# Patient Record
Sex: FEMALE | Race: WHITE | NOT HISPANIC OR LATINO | Employment: FULL TIME | URBAN - NONMETROPOLITAN AREA
[De-identification: names, ages, dates, MRNs, and addresses within clinical notes are randomized per-mention and may not be internally consistent; named-entity substitution may affect disease eponyms.]

---

## 2017-09-21 ENCOUNTER — TRANSCRIBE ORDERS (OUTPATIENT)
Dept: ADMINISTRATIVE | Facility: HOSPITAL | Age: 47
End: 2017-09-21

## 2017-09-21 DIAGNOSIS — Z12.31 VISIT FOR SCREENING MAMMOGRAM: Primary | ICD-10-CM

## 2017-11-03 ENCOUNTER — HOSPITAL ENCOUNTER (OUTPATIENT)
Dept: MAMMOGRAPHY | Facility: HOSPITAL | Age: 47
Discharge: HOME | End: 2017-11-03
Admitting: OBSTETRICS & GYNECOLOGY

## 2017-11-03 DIAGNOSIS — Z12.31 VISIT FOR SCREENING MAMMOGRAM: ICD-10-CM

## 2017-11-03 PROCEDURE — 77063 BREAST TOMOSYNTHESIS BI: CPT | Performed by: RADIOLOGY

## 2017-11-03 PROCEDURE — 77067 SCR MAMMO BI INCL CAD: CPT | Performed by: RADIOLOGY

## 2017-11-03 PROCEDURE — 77063 BREAST TOMOSYNTHESIS BI: CPT

## 2017-11-03 PROCEDURE — G0202 SCR MAMMO BI INCL CAD: HCPCS

## 2017-11-10 ENCOUNTER — HOSPITAL ENCOUNTER (OUTPATIENT)
Dept: ULTRASOUND IMAGING | Facility: HOSPITAL | Age: 47
Discharge: HOME | End: 2017-11-10
Admitting: RADIOLOGY

## 2017-11-10 DIAGNOSIS — R92.8 ABNORMAL MAMMOGRAM: ICD-10-CM

## 2017-11-10 PROCEDURE — 76642 ULTRASOUND BREAST LIMITED: CPT | Performed by: RADIOLOGY

## 2017-11-10 PROCEDURE — 76642 ULTRASOUND BREAST LIMITED: CPT

## 2020-12-23 ENCOUNTER — HOSPITAL ENCOUNTER (EMERGENCY)
Facility: HOSPITAL | Age: 50
Discharge: HOME | End: 2020-12-23
Attending: EMERGENCY MEDICINE | Admitting: EMERGENCY MEDICINE

## 2020-12-23 ENCOUNTER — APPOINTMENT (OUTPATIENT)
Dept: GENERAL RADIOLOGY | Facility: HOSPITAL | Age: 50
End: 2020-12-23

## 2020-12-23 VITALS
HEART RATE: 111 BPM | WEIGHT: 268 LBS | BODY MASS INDEX: 47.48 KG/M2 | TEMPERATURE: 97.7 F | OXYGEN SATURATION: 97 % | RESPIRATION RATE: 20 BRPM | DIASTOLIC BLOOD PRESSURE: 102 MMHG | SYSTOLIC BLOOD PRESSURE: 192 MMHG | HEIGHT: 63 IN
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
## 2020-12-23 DIAGNOSIS — M79.18 MUSCULOSKELETAL PAIN: ICD-10-CM

## 2020-12-23 PROCEDURE — 73562 X-RAY EXAM OF KNEE 3: CPT | Performed by: RADIOLOGY

## 2020-12-23 PROCEDURE — 73502 X-RAY EXAM HIP UNI 2-3 VIEWS: CPT | Performed by: RADIOLOGY

## 2020-12-23 PROCEDURE — 73502 X-RAY EXAM HIP UNI 2-3 VIEWS: CPT

## 2020-12-23 PROCEDURE — 73562 X-RAY EXAM OF KNEE 3: CPT

## 2020-12-23 PROCEDURE — 99283 EMERGENCY DEPT VISIT LOW MDM: CPT

## 2020-12-23 RX ORDER — ORPHENADRINE CITRATE 100 MG/1
100 TABLET, EXTENDED RELEASE ORAL 2 TIMES DAILY PRN
Qty: 14 TABLET | Refills: 0 | Status: SHIPPED | OUTPATIENT
Start: 2020-12-23 | End: 2021-12-06

## 2020-12-23 RX ORDER — IBUPROFEN 600 MG/1
600 TABLET ORAL EVERY 6 HOURS PRN
Qty: 30 TABLET | Refills: 0 | Status: SHIPPED | OUTPATIENT
Start: 2020-12-23 | End: 2021-12-06
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
## 2021-12-06 RX ORDER — BUSPIRONE HYDROCHLORIDE 7.5 MG/1
7.5 TABLET ORAL 2 TIMES DAILY
COMMUNITY

## 2021-12-06 RX ORDER — B-COMPLEX WITH VITAMIN C
50 TABLET ORAL 2 TIMES DAILY
COMMUNITY

## 2021-12-06 RX ORDER — PRAVASTATIN SODIUM 20 MG
20 TABLET ORAL DAILY
COMMUNITY
End: 2022-06-14

## 2021-12-06 RX ORDER — DIPHENOXYLATE HYDROCHLORIDE AND ATROPINE SULFATE 2.5; .025 MG/1; MG/1
TABLET ORAL
COMMUNITY
Start: 2013-07-31 | End: 2022-06-14

## 2021-12-06 RX ORDER — LOSARTAN POTASSIUM 50 MG/1
25 TABLET ORAL DAILY
COMMUNITY

## 2021-12-06 RX ORDER — HYDROCHLOROTHIAZIDE 12.5 MG/1
12.5 CAPSULE, GELATIN COATED ORAL DAILY
COMMUNITY
End: 2022-09-24 | Stop reason: HOSPADM

## 2021-12-06 RX ORDER — DILTIAZEM HYDROCHLORIDE 180 MG/1
180 CAPSULE, COATED, EXTENDED RELEASE ORAL DAILY
COMMUNITY
End: 2021-12-16

## 2021-12-06 RX ORDER — LEVOTHYROXINE SODIUM 0.07 MG/1
75 TABLET ORAL DAILY
COMMUNITY
Start: 2013-07-31

## 2021-12-06 RX ORDER — MELOXICAM 15 MG/1
15 TABLET ORAL DAILY
COMMUNITY
End: 2022-09-24 | Stop reason: HOSPADM

## 2021-12-06 RX ORDER — MULTIPLE VITAMINS W/ MINERALS TAB 9MG-400MCG
1 TAB ORAL DAILY
COMMUNITY
End: 2021-12-16

## 2021-12-06 RX ORDER — FEXOFENADINE HCL 180 MG/1
180 TABLET ORAL DAILY
COMMUNITY

## 2021-12-06 RX ORDER — ESOMEPRAZOLE MAGNESIUM 40 MG/1
40 CAPSULE, DELAYED RELEASE ORAL
COMMUNITY

## 2021-12-16 ENCOUNTER — OFFICE VISIT (OUTPATIENT)
Dept: BEHAVIORAL HEALTH | Facility: CLINIC | Age: 51
End: 2021-12-16

## 2021-12-16 ENCOUNTER — DOCUMENTATION (OUTPATIENT)
Dept: BARIATRICS/WEIGHT MGMT | Facility: CLINIC | Age: 51
End: 2021-12-16

## 2021-12-16 ENCOUNTER — OFFICE VISIT (OUTPATIENT)
Dept: BARIATRICS/WEIGHT MGMT | Facility: CLINIC | Age: 51
End: 2021-12-16

## 2021-12-16 VITALS
TEMPERATURE: 97.8 F | BODY MASS INDEX: 44.22 KG/M2 | RESPIRATION RATE: 18 BRPM | HEIGHT: 64 IN | WEIGHT: 259 LBS | OXYGEN SATURATION: 98 % | HEART RATE: 91 BPM | SYSTOLIC BLOOD PRESSURE: 140 MMHG | DIASTOLIC BLOOD PRESSURE: 96 MMHG

## 2021-12-16 DIAGNOSIS — Z71.89 ENCOUNTER FOR PSYCHOLOGICAL ASSESSMENT PRIOR TO BARIATRIC SURGERY: ICD-10-CM

## 2021-12-16 DIAGNOSIS — F41.9 ANXIETY: ICD-10-CM

## 2021-12-16 DIAGNOSIS — E66.01 MORBID OBESITY (HCC): ICD-10-CM

## 2021-12-16 DIAGNOSIS — K21.9 GASTROESOPHAGEAL REFLUX DISEASE, UNSPECIFIED WHETHER ESOPHAGITIS PRESENT: ICD-10-CM

## 2021-12-16 DIAGNOSIS — E03.9 HYPOTHYROIDISM, UNSPECIFIED TYPE: ICD-10-CM

## 2021-12-16 DIAGNOSIS — E66.01 MORBID OBESITY (HCC): Primary | ICD-10-CM

## 2021-12-16 DIAGNOSIS — E78.5 HYPERLIPIDEMIA, UNSPECIFIED HYPERLIPIDEMIA TYPE: ICD-10-CM

## 2021-12-16 DIAGNOSIS — E11.69 DIABETES MELLITUS TYPE 2 IN OBESE (HCC): ICD-10-CM

## 2021-12-16 DIAGNOSIS — E66.9 DIABETES MELLITUS TYPE 2 IN OBESE (HCC): ICD-10-CM

## 2021-12-16 DIAGNOSIS — I10 HYPERTENSION, UNSPECIFIED TYPE: ICD-10-CM

## 2021-12-16 DIAGNOSIS — M25.569 KNEE PAIN, UNSPECIFIED CHRONICITY, UNSPECIFIED LATERALITY: Primary | ICD-10-CM

## 2021-12-16 DIAGNOSIS — K80.20 CALCULUS OF GALLBLADDER WITHOUT CHOLECYSTITIS WITHOUT OBSTRUCTION: ICD-10-CM

## 2021-12-16 PROCEDURE — 90791 PSYCH DIAGNOSTIC EVALUATION: CPT | Performed by: PSYCHOLOGIST

## 2021-12-16 PROCEDURE — 99204 OFFICE O/P NEW MOD 45 MIN: CPT | Performed by: PHYSICIAN ASSISTANT

## 2021-12-16 RX ORDER — DILTIAZEM HYDROCHLORIDE 180 MG/1
180 CAPSULE, EXTENDED RELEASE ORAL DAILY
COMMUNITY
Start: 2021-11-30 | End: 2022-09-24 | Stop reason: HOSPADM

## 2021-12-16 RX ORDER — CLONIDINE HYDROCHLORIDE 0.1 MG/1
0.1 TABLET ORAL AS NEEDED
COMMUNITY
Start: 2021-10-10

## 2021-12-16 NOTE — PROGRESS NOTES
[facility]  [address]  [address]  [place], located within [facility] [id number] [phone]      Patient  Name:  Emily Germain  :  1970      Date of Visit: 2021      Chief Complaint:  weight gain; unable to maintain weight loss      History of Present Illness:  Emily Germain is a 51 y.o. female who presents today for evaluation, education and consultation regarding metabolic and bariatric surgery with Dr. Joel.     Emily has been overweight most of her life, has been 100 pounds or more overweight for 30 or more years and started dieting in her teens.  Previous diet attempts include: Sanjay Daley, High Protein, Herbal Life, Low Carbohydrate, Low Fat, Calorie Counting, Salma's Diet, Cabbage Soup and Slim Fast; Nutri-System, Optifast and Weight Watchers; Acutrim and Dexatrim.  The most weight Emily lost was 42 pounds w/ Optivia but was unable to maintain that weight loss.  Her maximum lifetime weight is 275 pounds.       Complete history has been obtained and discussed today, as pertinent to metabolic/ bariatric surgery.     Past Medical History:   Diagnosis Date   • Anxiety and depression    • Cholelithiasis     dx 18+yrs ago, relatively asx   • Diabetes (HCC)     newly dx, no insulin, A1C >7   • Dyspepsia    • Dyspnea on exertion    • Edema     HCTZ 12.5mg daily   • Fatigue    • GERD (gastroesophageal reflux disease)     on Nexium x 5+yrs, improves w/ wt.loss, remote EGD (20+yrs ago) unremarkable   • HTN (hypertension)    • Hyperlipidemia    • Hypothyroidism    • Knee pain     on Mobic, prn steroid injections (next due 3/2022)   • Morbid obesity (HCC)    • PCOS (polycystic ovarian syndrome)     does not tolerate Metformin     Past Surgical History:   Procedure Laterality Date   • DILATATION AND CURETTAGE      emergent, miscarriage @ 6mos   • ENDOMETRIAL ABLATION         Allergies   Allergen Reactions   • Tuberculin Tests Swelling       Current  Outpatient Medications:   •  busPIRone (BUSPAR) 7.5 MG tablet, Take 7.5 mg by mouth 3 (Three) Times a Day., Disp: , Rfl:   •  cloNIDine (CATAPRES) 0.1 MG tablet, TAKE 1 TABLET BY MOUTH TWICE DAILY AS NEEDED FOR BLOOD PRESSURE GREATER THAN 165/95, Disp: , Rfl:   •  esomeprazole (nexIUM) 40 MG capsule, Take 40 mg by mouth Every Morning Before Breakfast., Disp: , Rfl:   •  fexofenadine (ALLEGRA) 180 MG tablet, Take 180 mg by mouth Daily., Disp: , Rfl:   •  hydroCHLOROthiazide (MICROZIDE) 12.5 MG capsule, Take 12.5 mg by mouth Daily., Disp: , Rfl:   •  levothyroxine (Synthroid) 137 MCG tablet, Take  by mouth., Disp: , Rfl:   •  losartan (COZAAR) 50 MG tablet, Take 50 mg by mouth Daily., Disp: , Rfl:   •  meloxicam (MOBIC) 15 MG tablet, Take 15 mg by mouth Daily., Disp: , Rfl:   •  multivitamin (Multi-Vitamin Daily) tablet tablet, Take  by mouth., Disp: , Rfl:   •  pravastatin (PRAVACHOL) 20 MG tablet, Take 20 mg by mouth Daily., Disp: , Rfl:   •  SITagliptin (JANUVIA) 100 MG tablet, Take 100 mg by mouth Daily., Disp: , Rfl:   •  Tiadylt  MG 24 hr capsule, Take 180 mg by mouth Daily., Disp: , Rfl:   •  Zinc 100 MG tablet, Take  by mouth., Disp: , Rfl:     Social History     Socioeconomic History   • Marital status:    Tobacco Use   • Smoking status: Never Smoker   • Smokeless tobacco: Never Used   Substance and Sexual Activity   • Alcohol use: Never   • Drug use: Never     Social History     Social History Narrative    .  Lives in [facility].  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        Family History   Problem Relation Age of Onset   • Breast cancer Maternal Grandmother    • Hypertension Maternal Grandmother    • Hypertension Mother    • Diabetes Father    • Hypertension Father    • Hypertension Maternal Grandfather    • Heart attack Maternal Grandfather    • Hypertension Paternal Grandmother    • Diabetes Paternal Grandfather    • Hypertension Paternal Grandfather        Review of  Systems:  Constitutional:  reports fatigue, weight gain and denies fevers, chills.  HEENT:  denies headache, ear pain or loss of hearing, blurred or double vision, nasal discharge or sore throat.  Cardiovascular:  reports HTN, HLD and denies hx heart disease, hx MI, chest pain, hx DVT.  Respiratory:  denies cough , wheezing, sleep apnea, asthma, hx PE.  Gastrointestinal:  reports heartburn, gallbladder issues and denies dysphagia, nausea, vomiting, abdominal pain, IBS, liver disease.  Genitourinary:   denies history of  frequent UTI, incontinence, hematuria, dysuria, polyuria, polydipsia, renal insufficiency.    Musculoskeletal:  reports joint pain, arthritis and denies fibromyalgia and autoimmune disease.  Neurological:   denies migraines, numbness /tingling, dizziness, confusion, seizure, stroke.  Psychiatric:  reports hx depression, hx anxiety and denies bipolar disorder.  Endocrine:  reports PCOS, diabetes, thyroid disease and denies gout.  Hematologic: denies bruising, bleeding disorder, hx anemia, hx blood transfusion.  Skin:  denies rashes, hx MRSA.      COVID-19 Questionnaire:    1.  Have you previously been tested for COVID-19?    []  No  [x]  Yes  2.  Were you ever positive for COVID-19?    [x]  No  []  Yes  3.  Are you employed in a healthcare setting?    [x]  No  []  Yes  4.  Are you symptomatic for COVID-19 as defined by the CDC (fever, cough)?  If so, when did symptoms begin?    [x]  No  []  Yes, symptoms began **  5.  Have you been hospitalized for COVID-19?  If so, were you in the ICU?  [x]  No  []  Yes, but not in the ICU  []  Yes, and I was in the ICU  6.  Are you a resident in a congregate (group care setting?)    [x]  No  []  Yes  7.  Are you pregnant?  [x]  No  []  Yes      Physical Exam:  Vital Signs:  Weight: 117 kg (259 lb)   Body mass index is 45.16 kg/m².  Temp: 97.8 °F (36.6 °C)   Heart Rate: 91   BP: 140/96     Physical Exam  Vitals reviewed.   Constitutional:       Appearance: She is  well-developed.      Comments: wearing a mask   HENT:      Head: Normocephalic and atraumatic.   Eyes:      General: No scleral icterus.     Conjunctiva/sclera: Conjunctivae normal.   Neck:      Thyroid: No thyromegaly.   Cardiovascular:      Rate and Rhythm: Normal rate and regular rhythm.      Heart sounds: No murmur heard.      Pulmonary:      Effort: Pulmonary effort is normal. No respiratory distress.      Breath sounds: Normal breath sounds. No wheezing or rales.   Abdominal:      General: Bowel sounds are normal. There is no distension.      Palpations: Abdomen is soft. There is no mass.      Tenderness: There is no abdominal tenderness.      Hernia: No hernia is present.      Comments: no surgical scars   Musculoskeletal:         General: Normal range of motion.      Cervical back: Neck supple.   Skin:     General: Skin is warm and dry.      Findings: No rash.   Neurological:      Mental Status: She is alert and oriented to person, place, and time.      Gait: Gait normal.   Psychiatric:         Judgment: Judgment normal.         Patient Active Problem List   Diagnosis   • GERD (gastroesophageal reflux disease)   • HTN (hypertension)   • Hypothyroidism   • Hyperlipidemia   • Edema   • Diabetes (HCC)   • Knee pain   • Anxiety and depression   • Fatigue   • Dyspepsia   • Morbid obesity (HCC)   • Dyspnea on exertion   • PCOS (polycystic ovarian syndrome)   • Cholelithiasis       Assessment:  51 y.o. female with medically complicated obesity pursuing sleeve gastrectomy.    Metabolic & Bariatric Surgery is deemed medically necessary given the following: Patient's Body mass index is 45.16 kg/m². indicating that she is obese (BMI >30). Obesity-related health conditions include the following: hypertension, diabetes mellitus, dyslipidemias, GERD and osteoarthritis. Obesity is worsening. BMI is is above average; BMI management plan is completed. We discussed consulting a Bariatric surgeon.        Plan:  Further  evaluation will include: CBC, CMP, Lipids, TSH, HgA1C, H.Pylori serum, EKG, CXR, GBUS, Gastric Emptying Study and EGD.    Additional clearances needed prior to surgery will include: Cardiology.     Patient understands that bariatric surgery is not cosmetic surgery but rather a tool to help make a lifelong commitment to lifestyle changes including diet, exercise and behavior modifications.  The patient has been educated today on those expected postoperative lifestyle changes.  Psychological and Nutritional consultations will be completed prior to surgery.  Instructions on how to access [employer] (an internet based site w/ educational surgical videos) were given to the patient.  Recommended perioperative vitamin supplementation was reviewed.  The importance of avoiding ASA/ NSAIDS/ steroids/ tobacco/nicotine/ hormones/ immunomodulators perioperatively was discussed in detail.  All questions/concerns have been addressed.      Further input to follow pending the above.           SNEAH Segura

## 2021-12-16 NOTE — PROGRESS NOTES
PROGRESS NOTE    Data:    Emily Germain is a 51 y.o. female who met with the undersigned for a scheduled individual outpatient therapy session from 9:00 - 9:45am.      Clinical Maneuvering/Intervention:      Chief complaint and history of presenting illness/Problems: struggling with obesity for several years. Despite trying different weight loss plans and diets, the pt reported being unsuccessful in losing weight. A psychological evaluation was conducted in order to assess past and current level of functioning. Areas assessed included, but were not limited to: perception of social support, perception of ability to face and deal with challenges in life (positive functioning), anxiety symptoms, depressive symptoms, perspective on beliefs/belief system, coping skills for stress, intelligence level, addiction issues, etc. Therapeutic rapport was established. Interventions conducted today were geared towards assessing the pt's readiness for weight loss surgery and identifying and psychological contraindications for undergoing such a major life change. Social support was deemed strong (specific to weight loss surgery/weight loss in this manner and in a general sense): friends, family, best friend, . Current psychological struggles were described as low, but included anxiety related to job stress/job loss last May, but she talked about how she is healing emotionally. She has also become increasingly worried about her health (obesity). Coping skills for distress and related to undergoing a major life change such as weight loss surgery/weight loss were deemed strong and included perseverance, sense of humor, belief in herself that she can handle challenges in life. The pt endorsed having characteristics of readiness to undergo major life changes inherent in the journey of weight loss surgery. She could speak to having 'suffered enough,' and the decision to have weight loss surgery has 'clicked' for her. The  turning point for her was diagnosis of diabetes and knee problems. The pt expressed gratitude for today's visit.     Past Family and Social History:      History of family mental health problems: brother (bipolar, substance abuse).    Psychosocial history: treatment of psychiatric care in the past (a few sessions 2 months ago as part of a depression study), alcohol/substance abuse treatment in the past (N/A) , alcohol/substance abuse problems (N/A), inpatient psychiatric care (N/A).    Mental Status Exam (MSE):  Hygiene:  good  Dress: normal  Attitude:  cooperative and proactive  Motor Activity: normal  Speech: pressured, but she is redirectable  Mood:   excited and a little nervous  Affect:  congruent  Thought Processes: normal  Thought Content:  normal  Suicidal Thoughts:  not endorsed  Homicidal Thoughts:  not endorsed  Crisis Safety Plan: not needed   Hallucinations:  none      Patient's Support Network Includes:  family, friends      Progress toward goal: there is evidence to suggest that she is taking measures to improve the quality of her life including seeking weight loss surgery.       Functional Status: moderate to high      Prognosis: good with weight loss surgery    Evaluation, Diagnoses, and Ability/Capacity to Respond to Treatment:      The pt presented to be struggling with anxiety in general and anxiety related to health (obesity, BMI = 45.2). She also suffers chronic knee pain. Results of MSE demonstrated a functional status of moderate to high. Strengths: belief in self that she will be successful with weight loss surgery, etc (see detailed list of coping skills above). Needed for growth (CPT code requirement for Weaknesses): weight loss.      From a psychological standpoint, the pt presents as a good candidate for bariatric surgery. She is motivated for the surgery, has showed readiness for the lifestyle change in terms of starting to adjust her eating habits, and seems to have appropriate  expectations of how to prepare and how to live after surgery in order to lose weight successfully.    Treatment Plan:      Short term goals: diminish symptoms of anxiety via meeting additional points/criteria to qualify for weight loss surgery. Start improving her health by following up with her bariatric surgeon in order to receive weight loss surgery as soon as feasible/appropriate and demonstrate success with compliance to adhering to the recommended diet. Long term goals: reach a healthy weight, alleviation of knee pain, and remittance of anxiety altogether.      Chasidy Hope, PhD, LP

## 2021-12-16 NOTE — PROGRESS NOTES
"Weight Loss Surgery  Presurgical Nutrition Assessment     Emily Germain  12/16/2021  [id number]  [id number]  1970  female    Surgery desired: Sleeve Gastrectomy  Ht 161.3 cm (63.5\"); Wt 117 kg (259 #); BMI 45.16  Past Medical History:   Diagnosis Date   • Anxiety and depression    • Cholelithiasis     dx 18+yrs ago, relatively asx   • Diabetes (HCC)     newly dx, no insulin, A1C >7   • Dyspepsia    • Dyspnea on exertion    • Edema     HCTZ 12.5mg daily   • Fatigue    • GERD (gastroesophageal reflux disease)     on Nexium x 5+yrs, improves w/ wt.loss, remote EGD (20+yrs ago) unremarkable   • HTN (hypertension)    • Hyperlipidemia    • Hypothyroidism    • Knee pain     on Mobic, prn steroid injections (next due 3/2022)   • Morbid obesity (HCC)    • PCOS (polycystic ovarian syndrome)     does not tolerate Metformin     Past Surgical History:   Procedure Laterality Date   • DILATATION AND CURETTAGE  1987    emergent, miscarriage @ 6mos   • ENDOMETRIAL ABLATION  2012     Allergies   Allergen Reactions   • Tuberculin Tests Swelling       Current Outpatient Medications:   •  busPIRone (BUSPAR) 7.5 MG tablet, Take 7.5 mg by mouth 3 (Three) Times a Day., Disp: , Rfl:   •  cloNIDine (CATAPRES) 0.1 MG tablet, TAKE 1 TABLET BY MOUTH TWICE DAILY AS NEEDED FOR BLOOD PRESSURE GREATER THAN 165/95, Disp: , Rfl:   •  esomeprazole (nexIUM) 40 MG capsule, Take 40 mg by mouth Every Morning Before Breakfast., Disp: , Rfl:   •  fexofenadine (ALLEGRA) 180 MG tablet, Take 180 mg by mouth Daily., Disp: , Rfl:   •  hydroCHLOROthiazide (MICROZIDE) 12.5 MG capsule, Take 12.5 mg by mouth Daily., Disp: , Rfl:   •  levothyroxine (Synthroid) 137 MCG tablet, Take  by mouth., Disp: , Rfl:   •  losartan (COZAAR) 50 MG tablet, Take 50 mg by mouth Daily., Disp: , Rfl:   •  meloxicam (MOBIC) 15 MG tablet, Take 15 mg by mouth Daily., Disp: , Rfl:   •  multivitamin (Multi-Vitamin Daily) tablet tablet, Take  by mouth., Disp: , Rfl:   •  " pravastatin (PRAVACHOL) 20 MG tablet, Take 20 mg by mouth Daily., Disp: , Rfl:   •  SITagliptin (JANUVIA) 100 MG tablet, Take 100 mg by mouth Daily., Disp: , Rfl:   •  Tiadylt  MG 24 hr capsule, Take 180 mg by mouth Daily., Disp: , Rfl:   •  Zinc 100 MG tablet, Take  by mouth., Disp: , Rfl:       Nutrition Assessment    Estimated energy needs:  1755 kcal    Estimated calories for weight loss:  1400 kcal    IBW (Pounds):  145 #        Excess body weight (Pounds):  115 #        Nutrition Recall  24 Hour recall: (B) (L) (D) -  Reviewed and discussed with patient.  Pt has long-standing habit of drinking 2 - 16 oz bottles of regular Code Red Mt Dew per day.  Giving this up.  Drinking more water and diet Dr Pepper occasionally.  Breakfast @ 9 am = 1 biscuit /c 1/2 cup gravy & 4 slices delaney with a 16 oz diet Dr Pepper.  Lunch = 2 cups grapes with cheese & a 16 oz bottle of water.  Snack in the afternoon = 1/2 cup mixed nuts with 2 bottles of water, 16 oz each.  Dinner at 6 pm = 1 bottle of water /c salad. Diet is very low in protein, lacks fruits and vegetables and is low overall in total calories, with too few eating episodes. Pt to focus on ingesting adequate protein for successful weight loss in 3 balanced meals + 2 to 3 high protein snacks per day.  She will completely give up all soda and any other artificially or naturally sweetened beverages.        Exercise  never      Education    Provided information packet re:  Sleeve Gastrectomy  1. Reviewed guidelines for higher protein, limited carbohydrate diet to promote weight loss.  Encouraged patient to incorporate these principles of healthy eating from now until approximately 2 weeks prior to bariatric surgery date, when an even lower carbohydrate “liver-shrinking” regimen will be followed. (Information sheet re pre-op diet given).  Explained that after recovery from surgery this diet will again be followed to ensure further loss and for weight maintenance.     2. Encouraged patient to choose an acceptable protein supplement powder or shake for post-surgery liquid diet.  Provided product guidelines and examples.    3. Explained importance of goal setting to help in changing eating behaviors that are not conducive to weight loss.  Targeted several on a worksheet which also included spaces for patient to work on issues specific to them.  4. Provided follow-up options for support, including contact information for dietitians here, if desired.  Web-based support information and apps for smart phones and computers given.  Noted that monthly support group is offered at this clinic, and that support is associated with successful weight loss.    Recommend that team proceed with surgery and follow per protocol.      Nutrition Goals   Dietary Guidelines per information packet as described above  Protein goal:  grams per day   Carbohydrate goal:  100-140 grams per day  Eliminate soda, sweet tea, etc.     Exercise Goals  Continue current exercise routine   Add 15-30 minutes of activity per day as tolerated      Yesi Chavarria, YARA  12/16/2021  10:43 EST

## 2021-12-18 LAB
ALBUMIN SERPL-MCNC: 4.2 G/DL (ref 3.5–5.2)
ALBUMIN/GLOB SERPL: 1.4 G/DL
ALP SERPL-CCNC: 109 U/L (ref 39–117)
ALT SERPL-CCNC: 13 U/L (ref 1–33)
AST SERPL-CCNC: 14 U/L (ref 1–32)
BASOPHILS # BLD AUTO: 0.04 10*3/MM3 (ref 0–0.2)
BASOPHILS NFR BLD AUTO: 0.3 % (ref 0–1.5)
BILIRUB SERPL-MCNC: <0.2 MG/DL (ref 0–1.2)
BUN SERPL-MCNC: 20 MG/DL (ref 6–20)
BUN/CREAT SERPL: 25.6 (ref 7–25)
CALCIUM SERPL-MCNC: 9.9 MG/DL (ref 8.6–10.5)
CHLORIDE SERPL-SCNC: 103 MMOL/L (ref 98–107)
CHOLEST SERPL-MCNC: 157 MG/DL (ref 0–200)
CO2 SERPL-SCNC: 25.3 MMOL/L (ref 22–29)
CREAT SERPL-MCNC: 0.78 MG/DL (ref 0.57–1)
EOSINOPHIL # BLD AUTO: 0.23 10*3/MM3 (ref 0–0.4)
EOSINOPHIL NFR BLD AUTO: 1.6 % (ref 0.3–6.2)
ERYTHROCYTE [DISTWIDTH] IN BLOOD BY AUTOMATED COUNT: 16 % (ref 12.3–15.4)
GLOBULIN SER CALC-MCNC: 2.9 GM/DL
GLUCOSE SERPL-MCNC: 159 MG/DL (ref 65–99)
H PYLORI IGA SER-ACNC: <9 UNITS (ref 0–8.9)
H PYLORI IGG SER IA-ACNC: 0.64 INDEX VALUE (ref 0–0.79)
HBA1C MFR BLD: 7.1 % (ref 4.8–5.6)
HCT VFR BLD AUTO: 40.7 % (ref 34–46.6)
HDLC SERPL-MCNC: 29 MG/DL (ref 40–60)
HGB BLD-MCNC: 12.8 G/DL (ref 12–15.9)
IMM GRANULOCYTES # BLD AUTO: 0.07 10*3/MM3 (ref 0–0.05)
IMM GRANULOCYTES NFR BLD AUTO: 0.5 % (ref 0–0.5)
LDLC SERPL CALC-MCNC: 76 MG/DL (ref 0–100)
LYMPHOCYTES # BLD AUTO: 4.16 10*3/MM3 (ref 0.7–3.1)
LYMPHOCYTES NFR BLD AUTO: 28.2 % (ref 19.6–45.3)
MCH RBC QN AUTO: 25 PG (ref 26.6–33)
MCHC RBC AUTO-ENTMCNC: 31.4 G/DL (ref 31.5–35.7)
MCV RBC AUTO: 79.3 FL (ref 79–97)
MONOCYTES # BLD AUTO: 1.08 10*3/MM3 (ref 0.1–0.9)
MONOCYTES NFR BLD AUTO: 7.3 % (ref 5–12)
NEUTROPHILS # BLD AUTO: 9.17 10*3/MM3 (ref 1.7–7)
NEUTROPHILS NFR BLD AUTO: 62.1 % (ref 42.7–76)
NRBC BLD AUTO-RTO: 0.1 /100 WBC (ref 0–0.2)
PLATELET # BLD AUTO: 389 10*3/MM3 (ref 140–450)
POTASSIUM SERPL-SCNC: 4.9 MMOL/L (ref 3.5–5.2)
PROT SERPL-MCNC: 7.1 G/DL (ref 6–8.5)
RBC # BLD AUTO: 5.13 10*6/MM3 (ref 3.77–5.28)
SODIUM SERPL-SCNC: 142 MMOL/L (ref 136–145)
TRIGL SERPL-MCNC: 323 MG/DL (ref 0–150)
TSH SERPL DL<=0.005 MIU/L-ACNC: 3.02 UIU/ML (ref 0.27–4.2)
VLDLC SERPL CALC-MCNC: 52 MG/DL (ref 5–40)
WBC # BLD AUTO: 14.75 10*3/MM3 (ref 3.4–10.8)

## 2021-12-22 ENCOUNTER — PATIENT ROUNDING (BHMG ONLY) (OUTPATIENT)
Dept: BARIATRICS/WEIGHT MGMT | Facility: CLINIC | Age: 51
End: 2021-12-22

## 2021-12-22 NOTE — PROGRESS NOTES
A [employer] message has been sent to the patient for PATIENT ROUNDING for [facility].

## 2021-12-28 DIAGNOSIS — Z20.822 ENCOUNTER FOR PREOPERATIVE SCREENING LABORATORY TESTING FOR COVID-19 VIRUS: Primary | ICD-10-CM

## 2021-12-28 DIAGNOSIS — Z01.812 ENCOUNTER FOR PREOPERATIVE SCREENING LABORATORY TESTING FOR COVID-19 VIRUS: Primary | ICD-10-CM

## 2022-01-03 ENCOUNTER — OFFICE VISIT (OUTPATIENT)
Dept: BARIATRICS/WEIGHT MGMT | Facility: CLINIC | Age: 52
End: 2022-01-03

## 2022-01-03 VITALS
BODY MASS INDEX: 44.64 KG/M2 | OXYGEN SATURATION: 98 % | DIASTOLIC BLOOD PRESSURE: 80 MMHG | TEMPERATURE: 98.1 F | HEIGHT: 64 IN | HEART RATE: 88 BPM | SYSTOLIC BLOOD PRESSURE: 144 MMHG | WEIGHT: 261.5 LBS

## 2022-01-03 DIAGNOSIS — K21.9 GASTROESOPHAGEAL REFLUX DISEASE, UNSPECIFIED WHETHER ESOPHAGITIS PRESENT: Primary | ICD-10-CM

## 2022-01-03 DIAGNOSIS — E66.01 OBESITY, CLASS III, BMI 40-49.9 (MORBID OBESITY): ICD-10-CM

## 2022-01-03 PROCEDURE — 99213 OFFICE O/P EST LOW 20 MIN: CPT | Performed by: PHYSICIAN ASSISTANT

## 2022-01-03 NOTE — PROGRESS NOTES
[facility] Bariatric Surgery  [address]  [address]  [facility] [address]  [phone]        Patient Name:  Emily Germain  :  1970      Date of Visit: 2022      Reason for Visit:  Weight gain; unable to maintain weight loss; reflux      HPI: Emily Germain is a 51 y.o. female pursuing MBS with Dr. Del Valle.      Patient was seen in initial intake 21 by SNEHA Lynn.     Cardiac Clearance scheduled for 22.     Patient has not had any changes since her last office visit. She still has some breakthrough reflux despite the use of Nexium. EGD is scheduled for 22. GBUS and NM gastric emptying scheduled for 22.  No recent illness or hospitalizations.  No changes to her current medications.  Denies any dysphagia, nausea, vomiting, abdominal pain.    Past Medical History:   Diagnosis Date   • Anxiety and depression    • Cholelithiasis     dx 18+yrs ago, relatively asx   • Diabetes (HCC)     newly dx, no insulin, A1C >7   • Dyspepsia    • Dyspnea on exertion    • Edema     HCTZ 12.5mg daily   • Fatigue    • GERD (gastroesophageal reflux disease)     on Nexium x 5+yrs, improves w/ wt.loss, remote EGD (20+yrs ago) unremarkable   • HTN (hypertension)    • Hyperlipidemia    • Hypothyroidism    • Knee pain     on Mobic, prn steroid injections (next due 3/2022)   • Morbid obesity (HCC)    • PCOS (polycystic ovarian syndrome)     does not tolerate Metformin     Past Surgical History:   Procedure Laterality Date   • DILATATION AND CURETTAGE      emergent, miscarriage @ 6mos   • ENDOMETRIAL ABLATION       Outpatient Medications Marked as Taking for the 1/3/22 encounter (Office Visit) with Malgorzata Chung PA   Medication Sig Dispense Refill   • busPIRone (BUSPAR) 7.5 MG tablet Take 7.5 mg by mouth 3 (Three) Times a Day.     • cloNIDine (CATAPRES) 0.1 MG tablet TAKE 1 TABLET BY MOUTH TWICE DAILY AS NEEDED FOR BLOOD PRESSURE GREATER THAN 165/95     •  esomeprazole (nexIUM) 40 MG capsule Take 40 mg by mouth Every Morning Before Breakfast.     • fexofenadine (ALLEGRA) 180 MG tablet Take 180 mg by mouth Daily.     • hydroCHLOROthiazide (MICROZIDE) 12.5 MG capsule Take 12.5 mg by mouth Daily.     • levothyroxine (Synthroid) 137 MCG tablet Take  by mouth.     • losartan (COZAAR) 50 MG tablet Take 50 mg by mouth Daily.     • meloxicam (MOBIC) 15 MG tablet Take 15 mg by mouth Daily.     • multivitamin (Multi-Vitamin Daily) tablet tablet Take  by mouth.     • pravastatin (PRAVACHOL) 20 MG tablet Take 20 mg by mouth Daily.     • SITagliptin (JANUVIA) 100 MG tablet Take 100 mg by mouth Daily.     • Tiadylt  MG 24 hr capsule Take 180 mg by mouth Daily.     • Zinc 100 MG tablet Take  by mouth.         Allergies   Allergen Reactions   • Tuberculin Tests Swelling       Social History     Socioeconomic History   • Marital status:    Tobacco Use   • Smoking status: Never Smoker   • Smokeless tobacco: Never Used   Substance and Sexual Activity   • Alcohol use: Never   • Drug use: Never   • Sexual activity: Yes     Partners: Male     Birth control/protection: None     Comment: My      Social History     Social History Narrative    .  Lives in [facility].  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        Review of Systems   General: Positive for weight gain, Fatigue. Denies fever, chills, unintentional weight loss   HEENT: Denies nasal congestion, change in vision, ear pain, change in hearing, soar throat   CARDIAC: Denies chest pain, palpitations, edema   RESP: denies shortness of breath, cough, wheezing   GI: Positive for reflux. Denies abdominal pain, nausea, vomiting, diarrhea, constipation  Urinary: Denies polyuria, dysuria, hematuria   MSK: denies joint pain, swelling, gout, joint stiffness   Neuro: Denies seizures, weakness, numbness/tingling, LOC   Heme/Endo: denies bleeding, bruising, heat/cold intolerance, sweating   Psych: denies  "depression, anxiety    /80   Pulse 88   Temp 98.1 °F (36.7 °C)   Ht 161.3 cm (63.5\")   Wt 119 kg (261 lb 8 oz)   SpO2 98%   BMI 45.60 kg/m²     Physical Exam  Constitutional:       Appearance: She is obese.   HENT:      Head: Normocephalic and atraumatic.   Cardiovascular:      Rate and Rhythm: Normal rate and regular rhythm.   Pulmonary:      Effort: Pulmonary effort is normal.      Breath sounds: Normal breath sounds.   Abdominal:      General: Bowel sounds are normal. There is no distension.      Palpations: Abdomen is soft. There is no mass.      Tenderness: There is no abdominal tenderness.   Skin:     General: Skin is warm and dry.   Neurological:      General: No focal deficit present.      Mental Status: She is alert and oriented to person, place, and time.   Psychiatric:         Mood and Affect: Mood normal.         Behavior: Behavior normal.           Assessment:      ICD-10-CM ICD-9-CM   1. Gastroesophageal reflux disease, unspecified whether esophagitis present  K21.9 530.81   2. Obesity, Class III, BMI 40-49.9 (morbid obesity) (Formerly [facility])  E66.01 278.01       Patient's Body mass index is 45.6 kg/m². indicating that she is morbidly obese (BMI > 40 or > 35 with obesity - related health condition). Obesity-related health conditions include the following: as above . Obesity is improving with treatment. BMI is is above average; BMI management plan is completed. We discussed consulting a Bariatric surgeon..      Plan:      Will proceed with EGD with Dr. Del Valle at [facility]. The risks and benefits of the upper endoscopy were discussed with the patient in detail and all questions were answered.  Possibility of perforation, bleeding, aspiration, and anesthesia reaction were reviewed.  Patient agrees to proceed.    "

## 2022-01-14 ENCOUNTER — LAB (OUTPATIENT)
Dept: LAB | Facility: HOSPITAL | Age: 52
End: 2022-01-14

## 2022-01-14 ENCOUNTER — HOSPITAL ENCOUNTER (OUTPATIENT)
Dept: NUCLEAR MEDICINE | Facility: HOSPITAL | Age: 52
Discharge: HOME | End: 2022-01-14

## 2022-01-14 ENCOUNTER — HOSPITAL ENCOUNTER (OUTPATIENT)
Dept: ULTRASOUND IMAGING | Facility: HOSPITAL | Age: 52
Discharge: HOME | End: 2022-01-14

## 2022-01-14 DIAGNOSIS — E66.9 DIABETES MELLITUS TYPE 2 IN OBESE: ICD-10-CM

## 2022-01-14 DIAGNOSIS — K80.20 CALCULUS OF GALLBLADDER WITHOUT CHOLECYSTITIS WITHOUT OBSTRUCTION: ICD-10-CM

## 2022-01-14 DIAGNOSIS — K21.9 GASTROESOPHAGEAL REFLUX DISEASE, UNSPECIFIED WHETHER ESOPHAGITIS PRESENT: ICD-10-CM

## 2022-01-14 DIAGNOSIS — Z20.822 ENCOUNTER FOR PREOPERATIVE SCREENING LABORATORY TESTING FOR COVID-19 VIRUS: ICD-10-CM

## 2022-01-14 DIAGNOSIS — Z01.812 ENCOUNTER FOR PREOPERATIVE SCREENING LABORATORY TESTING FOR COVID-19 VIRUS: ICD-10-CM

## 2022-01-14 DIAGNOSIS — E11.69 DIABETES MELLITUS TYPE 2 IN OBESE: ICD-10-CM

## 2022-01-14 LAB — SARS-COV-2 RNA RESP QL NAA+PROBE: NOT DETECTED

## 2022-01-14 PROCEDURE — 0 TECHNETIUM SULFUR COLLOID: Performed by: PHYSICIAN ASSISTANT

## 2022-01-14 PROCEDURE — 78264 GASTRIC EMPTYING IMG STUDY: CPT | Performed by: RADIOLOGY

## 2022-01-14 PROCEDURE — A9541 TC99M SULFUR COLLOID: HCPCS | Performed by: PHYSICIAN ASSISTANT

## 2022-01-14 PROCEDURE — 78264 GASTRIC EMPTYING IMG STUDY: CPT

## 2022-01-14 PROCEDURE — C9803 HOPD COVID-19 SPEC COLLECT: HCPCS

## 2022-01-14 PROCEDURE — 76705 ECHO EXAM OF ABDOMEN: CPT

## 2022-01-14 PROCEDURE — 87635 SARS-COV-2 COVID-19 AMP PRB: CPT

## 2022-01-14 PROCEDURE — 76705 ECHO EXAM OF ABDOMEN: CPT | Performed by: RADIOLOGY

## 2022-01-14 RX ADMIN — TECHNETIUM TC 99M SULFUR COLLOID 1 DOSE: KIT at 07:45

## 2022-01-17 ENCOUNTER — LAB REQUISITION (OUTPATIENT)
Dept: LAB | Facility: HOSPITAL | Age: 52
End: 2022-01-17

## 2022-01-17 ENCOUNTER — OUTSIDE FACILITY SERVICE (OUTPATIENT)
Dept: BARIATRICS/WEIGHT MGMT | Facility: CLINIC | Age: 52
End: 2022-01-17

## 2022-01-17 DIAGNOSIS — K21.9 GASTRO-ESOPHAGEAL REFLUX DISEASE WITHOUT ESOPHAGITIS: ICD-10-CM

## 2022-01-17 PROCEDURE — 43239 EGD BIOPSY SINGLE/MULTIPLE: CPT | Performed by: SURGERY

## 2022-01-17 PROCEDURE — 88342 IMHCHEM/IMCYTCHM 1ST ANTB: CPT | Performed by: SURGERY

## 2022-01-17 PROCEDURE — 88305 TISSUE EXAM BY PATHOLOGIST: CPT | Performed by: SURGERY

## 2022-01-19 LAB
CYTO UR: NORMAL
LAB AP CASE REPORT: NORMAL
LAB AP CLINICAL INFORMATION: NORMAL
PATH REPORT.FINAL DX SPEC: NORMAL
PATH REPORT.GROSS SPEC: NORMAL

## 2022-01-27 ENCOUNTER — TELEMEDICINE (OUTPATIENT)
Dept: BARIATRICS/WEIGHT MGMT | Facility: CLINIC | Age: 52
End: 2022-01-27

## 2022-01-27 ENCOUNTER — TELEPHONE (OUTPATIENT)
Dept: BARIATRICS/WEIGHT MGMT | Facility: CLINIC | Age: 52
End: 2022-01-27

## 2022-01-27 DIAGNOSIS — K80.20 CALCULUS OF GALLBLADDER WITHOUT CHOLECYSTITIS WITHOUT OBSTRUCTION: Primary | ICD-10-CM

## 2022-01-27 PROCEDURE — 99214 OFFICE O/P EST MOD 30 MIN: CPT | Performed by: PHYSICIAN ASSISTANT

## 2022-01-27 NOTE — TELEPHONE ENCOUNTER
Informed patient, per msg from SNEHA Robb,  that her GBUS was abnormal.  It did show stones (which they were somewhat expecting), but it also showed inflammation (thickening) of the gallbladder, and the report also notes that she had some RUQ pain/tenderness during the evaluation.  Typically they  would plan to remove the gallbladder during the weight loss surgery, but if she is having gallbladder issues - they may need to go ahead and remove her gallbladder.  Patient states understanding of information.  Patient is not currently having any known symptoms, but would like to set up an appointment for OV or VV.  Patient is currently sick with COVID.  Patient would like to possibly get her visit after she is out of quarantine at some unknown point next week.

## 2022-01-27 NOTE — PROGRESS NOTES
[facility] BARIATRIC SURGERY  [address]  [address]  [facility] [address] [phone]      Patient  Name:  Emily Germain  :  1970      Date of Visit: 2022      Chief Complaint:  follow up - reviewing test results      History of Present Illness:  Emily Germain is a 51 y.o. female pursuing LSG w/ Dr. Del Valle.     Intake Eval 21.  Was initially pursuing surgery w/ Dr. Joel.  Preop EGD was scheduled w/ Dr. Del Valle d/t schedule/availability.  As such, patient wishes to continue her process w/ Dr. Del Valle.     EGD 21 w/ [facility] revealed diffuse gastritis, no HH, Z line 41cm.  BX benign.     She takes Nexium daily.  H.Pylori ab (-).   GES 22 @[facility] - normal.    GBUS 22 @[facility] reads holelithiasis, gallbladder wall thickening, and positive sonographic  Tillman's sign.    She has a known hx gallstones, dx 18+yrs ago, remains asymptomatic.  She denies any pain or discomfort during her most recent GBUS.  No N/V/F/C.      Was dx w/ COVID 22 - coughing, laryngitis, back pains - currently isolating @home.          Past Medical History:   Diagnosis Date   • Anxiety and depression    • Cholelithiasis     dx 18+yrs ago, relatively asx   • Diabetes (HCC)     newly dx, no insulin, A1C >7   • Dyspepsia    • Dyspnea on exertion    • Edema     HCTZ 12.5mg daily   • Fatigue    • GERD (gastroesophageal reflux disease)     on Nexium x 5+yrs, improves w/ wt.loss, remote EGD (20+yrs ago) unremarkable   • HTN (hypertension)    • Hyperlipidemia    • Hypothyroidism    • Knee pain     on Mobic, prn steroid injections (next due 3/2022)   • Morbid obesity (HCC)    • PCOS (polycystic ovarian syndrome)     does not tolerate Metformin     Past Surgical History:   Procedure Laterality Date   • DILATATION AND CURETTAGE      emergent, miscarriage @ 6mos   • ENDOMETRIAL ABLATION         Allergies   Allergen Reactions   • Tuberculin Tests Swelling       Current Outpatient  Medications:   •  busPIRone (BUSPAR) 7.5 MG tablet, Take 7.5 mg by mouth 3 (Three) Times a Day., Disp: , Rfl:   •  cloNIDine (CATAPRES) 0.1 MG tablet, TAKE 1 TABLET BY MOUTH TWICE DAILY AS NEEDED FOR BLOOD PRESSURE GREATER THAN 165/95, Disp: , Rfl:   •  esomeprazole (nexIUM) 40 MG capsule, Take 40 mg by mouth Every Morning Before Breakfast., Disp: , Rfl:   •  fexofenadine (ALLEGRA) 180 MG tablet, Take 180 mg by mouth Daily., Disp: , Rfl:   •  hydroCHLOROthiazide (MICROZIDE) 12.5 MG capsule, Take 12.5 mg by mouth Daily., Disp: , Rfl:   •  levothyroxine (Synthroid) 137 MCG tablet, Take  by mouth., Disp: , Rfl:   •  losartan (COZAAR) 50 MG tablet, Take 50 mg by mouth Daily., Disp: , Rfl:   •  meloxicam (MOBIC) 15 MG tablet, Take 15 mg by mouth Daily., Disp: , Rfl:   •  multivitamin (Multi-Vitamin Daily) tablet tablet, Take  by mouth., Disp: , Rfl:   •  pravastatin (PRAVACHOL) 20 MG tablet, Take 20 mg by mouth Daily., Disp: , Rfl:   •  SITagliptin (JANUVIA) 100 MG tablet, Take 100 mg by mouth Daily., Disp: , Rfl:   •  Tiadylt  MG 24 hr capsule, Take 180 mg by mouth Daily., Disp: , Rfl:   •  Zinc 100 MG tablet, Take  by mouth., Disp: , Rfl:     Social History     Socioeconomic History   • Marital status:    Tobacco Use   • Smoking status: Never Smoker   • Smokeless tobacco: Never Used   Substance and Sexual Activity   • Alcohol use: Never   • Drug use: Never   • Sexual activity: Yes     Partners: Male     Birth control/protection: None     Comment: My      Social History     Social History Narrative    .  Lives in [facility].  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          Physical Exam:  Vital Signs:  Weight: 119 kg (261 lb 8 oz)   Body mass index is 45.6 kg/m².                Physical Exam  Constitutional:       General: She is not in acute distress.     Appearance: She is well-developed.   Eyes:      General: No scleral icterus.  Pulmonary:      Effort: Pulmonary effort is normal.    Neurological:      Mental Status: She is alert and oriented to person, place, and time.         Patient Active Problem List   Diagnosis   • GERD (gastroesophageal reflux disease)   • HTN (hypertension)   • Hypothyroidism   • Hyperlipidemia   • Edema   • Diabetes (HCC)   • Knee pain   • Anxiety and depression   • Fatigue   • Dyspepsia   • Morbid obesity (HCC)   • Dyspnea on exertion   • PCOS (polycystic ovarian syndrome)   • Cholelithiasis       Assessment:  51 y.o. female with medically complicated obesity pursuing sleeve gastrectomy.      ICD-10-CM ICD-9-CM   1. Calculus of gallbladder without cholecystitis without obstruction  K80.20 574.20         Plan:   Currently asymptomatic.  Does not wish to pursue cholecystectomy at this time.  Discussed signs/symptoms including severe pain, N/V, fevers/chills which would warrant sooner intervention.  For now wants to plan to have gallbladder removed during LSG.        SNEHA Segura        Note: This was an audio and video enabled telemedicine encounter conducted via Zoom during the COVID-19 pandemic.  Patient is located in [place].  Provider is at her office.  Consent was obtained prior to the visit.

## 2022-01-27 NOTE — TELEPHONE ENCOUNTER
----- Message from Sharmin Joel MD sent at 1/26/2022  4:15 PM EST -----    I guess call her and ask about her symptoms: if completely asymptomatic, then wait till sleeve.  If severe pain or nausea, then we should do sooner.  This might be something we can take care of at [facility] if her BMI if amenable and she doesn't have too many comorbidities.    ----- Message -----  From: Miriam Milligan PA  Sent: 1/26/2022   1:11 PM EST  To: Sharmin Joel MD    Pursuing LSG w/ you.  Noted hx cholelithiasis 18+yrs ago, but says has been asx.  GBUS reads cholelithiasis, gallbladder wall thickening, and positive sonographic Tillman's sign.    She had a recent EGD w/ Dr. Del Valle (not sure why it did not get scheduled w/ you...).    OK to wait and plan jayson w/ LSG, or does this need to be addressed sooner?  I'm not sure what her LSG timeline will be.... thanks!

## 2022-01-28 VITALS — HEIGHT: 64 IN | BODY MASS INDEX: 44.64 KG/M2 | WEIGHT: 261.5 LBS

## 2022-03-09 ENCOUNTER — OFFICE VISIT (OUTPATIENT)
Dept: CARDIOLOGY | Facility: CLINIC | Age: 52
End: 2022-03-09

## 2022-03-09 VITALS
HEIGHT: 63 IN | DIASTOLIC BLOOD PRESSURE: 70 MMHG | HEART RATE: 86 BPM | OXYGEN SATURATION: 98 % | WEIGHT: 256.6 LBS | SYSTOLIC BLOOD PRESSURE: 133 MMHG | BODY MASS INDEX: 45.46 KG/M2

## 2022-03-09 DIAGNOSIS — R06.09 DYSPNEA ON EXERTION: Primary | ICD-10-CM

## 2022-03-09 DIAGNOSIS — Z01.810 PRE-OPERATIVE CARDIOVASCULAR EXAMINATION: ICD-10-CM

## 2022-03-09 DIAGNOSIS — I10 PRIMARY HYPERTENSION: ICD-10-CM

## 2022-03-09 PROCEDURE — 99203 OFFICE O/P NEW LOW 30 MIN: CPT | Performed by: PHYSICIAN ASSISTANT

## 2022-03-09 PROCEDURE — 93000 ELECTROCARDIOGRAM COMPLETE: CPT | Performed by: PHYSICIAN ASSISTANT

## 2022-03-09 NOTE — PROGRESS NOTES
Subjective   Emily Germain is a 51 y.o. female     Chief Complaint   Patient presents with   • [facility]     CC for surg.        HPI      The patient presents to clinic today for initial evaluation.  Cardiac clearance has been requested prior to bariatric surgery.  The patient denies cardiovascular history.  An EKG is needed in the preoperative setting.  From cardiovascular standpoint, the patient is doing well.  She denies chest pain.  She has stable dyspnea.  She denies failure nor dysrhythmic symptoms.  She has had no significant cardiovascular history, but reports family history of the same.  An EKG was performed today which indicates sinus rhythm, rate 84, normal axis, nonspecific ST and T wave changes, and possible septal wall MI age-indeterminate.  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  She has had an echo performed on her recently and reports that she had normal systolic function, no focal wall motion abnormalities, no significant valvular issues.  The patient is doing well otherwise and specifically denies cardiac issues or complaints.  Current Outpatient Medications   Medication Sig Dispense Refill   • busPIRone (BUSPAR) 7.5 MG tablet Take 7.5 mg by mouth 3 (Three) Times a Day.     • cloNIDine (CATAPRES) 0.1 MG tablet TAKE 1 TABLET BY MOUTH TWICE DAILY AS NEEDED FOR BLOOD PRESSURE GREATER THAN 165/95     • esomeprazole (nexIUM) 40 MG capsule Take 40 mg by mouth Every Morning Before Breakfast.     • fexofenadine (ALLEGRA) 180 MG tablet Take 180 mg by mouth Daily.     • hydroCHLOROthiazide (MICROZIDE) 12.5 MG capsule Take 12.5 mg by mouth Daily.     • levothyroxine (SYNTHROID, LEVOTHROID) 137 MCG tablet Take  by mouth.     • losartan (COZAAR) 50 MG tablet Take 50 mg by mouth Daily.     • meloxicam (MOBIC) 15 MG tablet Take 15 mg by mouth Daily.     • multivitamin (THERAGRAN) tablet tablet Take  by mouth.     • pravastatin (PRAVACHOL) 20 MG tablet Take 20 mg by mouth Daily.     •  SITagliptin (JANUVIA) 100 MG tablet Take 100 mg by mouth Daily.     • Tiadylt  MG 24 hr capsule Take 180 mg by mouth Daily.     • Zinc 100 MG tablet Take  by mouth.       No current facility-administered medications for this visit.       Tuberculin tests    Past Medical History:   Diagnosis Date   • Anxiety and depression    • Cholelithiasis     dx 18+yrs ago, relatively asx   • Diabetes (HCC)     newly dx, no insulin, A1C >7   • Dyspepsia    • Dyspnea on exertion    • Edema     HCTZ 12.5mg daily   • Fatigue    • GERD (gastroesophageal reflux disease)     on Nexium x 5+yrs, improves w/ wt.loss, remote EGD (20+yrs ago) unremarkable   • HTN (hypertension)    • Hyperlipidemia    • Hypothyroidism    • Knee pain     on Mobic, prn steroid injections (next due 3/2022)   • Morbid obesity (HCC)    • PCOS (polycystic ovarian syndrome)     does not tolerate Metformin       Social History     Socioeconomic History   • Marital status:    Tobacco Use   • Smoking status: Never Smoker   • Smokeless tobacco: Never Used   Substance and Sexual Activity   • Alcohol use: Never   • Drug use: Never   • Sexual activity: Yes     Partners: Male     Birth control/protection: None     Comment: My        Family History   Problem Relation Age of Onset   • Breast cancer Maternal Grandmother    • Hypertension Maternal Grandmother    • Cancer Maternal Grandmother         Breast   • Depression Maternal Grandmother         Bi-poler 1972   • Mental illness Maternal Grandmother    • Miscarriages / Stillbirths Maternal Grandmother    • Hypertension Mother    • Diabetes Father    • Hypertension Father    • Heart disease Father    • Hypertension Maternal Grandfather    • Heart attack Maternal Grandfather    • Heart disease Maternal Grandfather         Passed from massive MI   • Hypertension Paternal Grandmother    • Diabetes Paternal Grandfather    • Hypertension Paternal Grandfather    • Heart disease Maternal Uncle    • Miscarriages  "/ Stillbirths Sister         1987 D& C       Review of Systems   Constitutional: Negative for chills, fatigue and fever.   HENT: Positive for congestion and rhinorrhea. Negative for sore throat.    Eyes: Positive for visual disturbance (contatcs ).   Respiratory: Negative.  Negative for chest tightness, shortness of breath and wheezing.    Cardiovascular: Negative.  Negative for chest pain, palpitations and leg swelling.   Gastrointestinal: Negative.    Endocrine: Negative.    Genitourinary: Negative.    Musculoskeletal: Negative.  Negative for arthralgias, back pain and neck pain.   Skin: Negative.  Negative for rash and wound.   Allergic/Immunologic: Positive for environmental allergies.   Neurological: Negative.  Negative for dizziness, weakness, numbness and headaches.   Hematological: Negative.  Does not bruise/bleed easily.   Psychiatric/Behavioral: Negative.  Negative for sleep disturbance.       Objective     Vitals:    03/09/22 0838   BP: 133/70   BP Location: Left arm   Patient Position: Sitting   Pulse: 86   SpO2: 98%   Weight: 116 kg (256 lb 9.6 oz)   Height: 160 cm (63\")        /70 (BP Location: Left arm, Patient Position: Sitting)   Pulse 86   Ht 160 cm (63\")   Wt 116 kg (256 lb 9.6 oz)   SpO2 98%   BMI 45.45 kg/m²      Lab Results (most recent)     None          Physical Exam  Vitals and nursing note reviewed.   Constitutional:       General: She is not in acute distress.     Appearance: She is well-developed.   HENT:      Head: Normocephalic and atraumatic.   Eyes:      Conjunctiva/sclera: Conjunctivae normal.      Pupils: Pupils are equal, round, and reactive to light.   Neck:      Vascular: No JVD.      Trachea: No tracheal deviation.   Cardiovascular:      Rate and Rhythm: Normal rate and regular rhythm.      Heart sounds: Normal heart sounds.   Pulmonary:      Effort: Pulmonary effort is normal.      Breath sounds: Normal breath sounds.   Abdominal:      General: Bowel sounds are " normal. There is no distension.      Palpations: Abdomen is soft. There is no mass.      Tenderness: There is no abdominal tenderness. There is no guarding or rebound.   Musculoskeletal:         General: No tenderness or deformity. Normal range of motion.      Cervical back: Normal range of motion and neck supple.   Skin:     General: Skin is warm and dry.      Coloration: Skin is not pale.      Findings: No erythema or rash.   Neurological:      Mental Status: She is alert and oriented to person, place, and time.   Psychiatric:         Behavior: Behavior normal.         Thought Content: Thought content normal.         Judgment: Judgment normal.         Procedure     ECG 12 Lead    Date/Time: 3/9/2022 8:41 AM  Performed by: Neel Johnson PA  Authorized by: Neel Johnson PA   Comparison: not compared with previous ECG   Comments: Sinus rhythm, minor nonspecific ST and T wave changes, possible septal wall MI age-indeterminate, no acute changes noted.                 Assessment/Plan      Diagnosis Plan   1. Dyspnea on exertion     2. Primary hypertension     3. Pre-operative cardiovascular examination       1.  The patient presents for preoperative cardiac evaluation prior to bariatric surgery.  She is doing well with no specific cardiovascular history or symptoms at this time.  EKG question septal wall MI but the patient had recent echocardiogram, all as above, which was felt to indicate preserved systolic function, no focal wall motion abnormalities, normal structure otherwise.  As she is doing well, I do not feel further evaluation is warranted from cardiovascular standpoint.  I feel that she would be at acceptable risk for cardiovascular standpoint for surgery.  We can see her on an as-needed basis at this time.    2.  Blood pressures appear to be well controlled at this time.  She will monitor that and call to us for any ongoing issues.    3.  I would make no adjustments in medications.  We will see her  back as needed in the future.                      Electronically signed by:

## 2022-03-30 DIAGNOSIS — R53.83 FATIGUE, UNSPECIFIED TYPE: ICD-10-CM

## 2022-03-30 DIAGNOSIS — R06.00 DYSPNEA, UNSPECIFIED TYPE: Primary | ICD-10-CM

## 2022-05-11 ENCOUNTER — HOSPITAL ENCOUNTER (OUTPATIENT)
Dept: GENERAL RADIOLOGY | Facility: HOSPITAL | Age: 52
Discharge: HOME | End: 2022-05-11

## 2022-05-11 ENCOUNTER — LAB (OUTPATIENT)
Dept: LAB | Facility: HOSPITAL | Age: 52
End: 2022-05-11

## 2022-05-11 DIAGNOSIS — R53.83 FATIGUE, UNSPECIFIED TYPE: ICD-10-CM

## 2022-05-11 DIAGNOSIS — R06.00 DYSPNEA, UNSPECIFIED TYPE: ICD-10-CM

## 2022-05-11 PROCEDURE — 36415 COLL VENOUS BLD VENIPUNCTURE: CPT

## 2022-05-11 PROCEDURE — 71046 X-RAY EXAM CHEST 2 VIEWS: CPT

## 2022-05-11 PROCEDURE — 71046 X-RAY EXAM CHEST 2 VIEWS: CPT | Performed by: RADIOLOGY

## 2022-05-11 PROCEDURE — 85027 COMPLETE CBC AUTOMATED: CPT

## 2022-05-11 PROCEDURE — 80053 COMPREHEN METABOLIC PANEL: CPT

## 2022-05-12 LAB
ALBUMIN SERPL-MCNC: 4.2 G/DL (ref 3.5–5.2)
ALBUMIN/GLOB SERPL: 1.2 G/DL
ALP SERPL-CCNC: 97 U/L (ref 39–117)
ALT SERPL W P-5'-P-CCNC: 19 U/L (ref 1–33)
ANION GAP SERPL CALCULATED.3IONS-SCNC: 13.6 MMOL/L (ref 5–15)
AST SERPL-CCNC: 19 U/L (ref 1–32)
BILIRUB SERPL-MCNC: 0.2 MG/DL (ref 0–1.2)
BUN SERPL-MCNC: 14 MG/DL (ref 6–20)
BUN/CREAT SERPL: 16.3 (ref 7–25)
CALCIUM SPEC-SCNC: 10.1 MG/DL (ref 8.6–10.5)
CHLORIDE SERPL-SCNC: 103 MMOL/L (ref 98–107)
CO2 SERPL-SCNC: 24.4 MMOL/L (ref 22–29)
CREAT SERPL-MCNC: 0.86 MG/DL (ref 0.57–1)
DEPRECATED RDW RBC AUTO: 49.4 FL (ref 37–54)
EGFRCR SERPLBLD CKD-EPI 2021: 80.9 ML/MIN/1.73
ERYTHROCYTE [DISTWIDTH] IN BLOOD BY AUTOMATED COUNT: 15.5 % (ref 12.3–15.4)
GLOBULIN UR ELPH-MCNC: 3.5 GM/DL
GLUCOSE SERPL-MCNC: 148 MG/DL (ref 65–99)
HCT VFR BLD AUTO: 42.1 % (ref 34–46.6)
HGB BLD-MCNC: 13.7 G/DL (ref 12–15.9)
MCH RBC QN AUTO: 28.2 PG (ref 26.6–33)
MCHC RBC AUTO-ENTMCNC: 32.5 G/DL (ref 31.5–35.7)
MCV RBC AUTO: 86.8 FL (ref 79–97)
PLATELET # BLD AUTO: 364 10*3/MM3 (ref 140–450)
PMV BLD AUTO: 10.5 FL (ref 6–12)
POTASSIUM SERPL-SCNC: 3.9 MMOL/L (ref 3.5–5.2)
PROT SERPL-MCNC: 7.7 G/DL (ref 6–8.5)
RBC # BLD AUTO: 4.85 10*6/MM3 (ref 3.77–5.28)
SODIUM SERPL-SCNC: 141 MMOL/L (ref 136–145)
WBC NRBC COR # BLD: 13.66 10*3/MM3 (ref 3.4–10.8)

## 2022-05-17 ENCOUNTER — CONSULT (OUTPATIENT)
Dept: BARIATRICS/WEIGHT MGMT | Facility: CLINIC | Age: 52
End: 2022-05-17

## 2022-05-17 VITALS
HEART RATE: 80 BPM | TEMPERATURE: 98.4 F | BODY MASS INDEX: 47.31 KG/M2 | RESPIRATION RATE: 20 BRPM | WEIGHT: 267 LBS | OXYGEN SATURATION: 98 % | DIASTOLIC BLOOD PRESSURE: 80 MMHG | SYSTOLIC BLOOD PRESSURE: 140 MMHG | HEIGHT: 63 IN

## 2022-05-17 DIAGNOSIS — E66.01 MORBID OBESITY WITH BODY MASS INDEX OF 45.0-49.9 IN ADULT: Primary | ICD-10-CM

## 2022-05-17 DIAGNOSIS — K80.20 CHOLELITHIASIS WITHOUT CHOLECYSTITIS: ICD-10-CM

## 2022-05-17 PROCEDURE — 99214 OFFICE O/P EST MOD 30 MIN: CPT | Performed by: SURGERY

## 2022-05-17 RX ORDER — ENOXAPARIN SODIUM 150 MG/ML
40 INJECTION SUBCUTANEOUS ONCE
Status: CANCELLED | OUTPATIENT
Start: 2022-05-17 | End: 2022-05-17

## 2022-05-17 RX ORDER — PRENATAL VIT/IRON FUM/FOLIC AC 27MG-0.8MG
1 TABLET ORAL DAILY
COMMUNITY
Start: 2022-04-28

## 2022-05-17 RX ORDER — PANTOPRAZOLE SODIUM 40 MG/10ML
40 INJECTION, POWDER, LYOPHILIZED, FOR SOLUTION INTRAVENOUS ONCE
Status: CANCELLED | OUTPATIENT
Start: 2022-05-17 | End: 2022-05-17

## 2022-05-17 RX ORDER — ROSUVASTATIN CALCIUM 10 MG/1
10 TABLET, COATED ORAL DAILY
COMMUNITY
Start: 2022-05-15

## 2022-05-17 RX ORDER — ACETAMINOPHEN 500 MG
1000 TABLET ORAL ONCE
Status: CANCELLED | OUTPATIENT
Start: 2022-05-17 | End: 2022-05-17

## 2022-05-17 RX ORDER — CHLORHEXIDINE GLUCONATE 0.12 MG/ML
30 RINSE ORAL
Status: CANCELLED | OUTPATIENT
Start: 2022-05-17 | End: 2022-05-17

## 2022-05-17 RX ORDER — SODIUM CHLORIDE 0.9 % (FLUSH) 0.9 %
3 SYRINGE (ML) INJECTION EVERY 12 HOURS SCHEDULED
Status: CANCELLED | OUTPATIENT
Start: 2022-05-17

## 2022-05-17 RX ORDER — GABAPENTIN 100 MG/1
600 CAPSULE ORAL ONCE
Status: CANCELLED | OUTPATIENT
Start: 2022-05-17 | End: 2022-05-17

## 2022-05-17 RX ORDER — ENOXAPARIN SODIUM 100 MG/ML
40 INJECTION SUBCUTANEOUS
Qty: 8.4 ML | Refills: 0 | Status: SHIPPED | OUTPATIENT
Start: 2022-05-17 | End: 2022-06-07

## 2022-05-17 RX ORDER — SODIUM CHLORIDE 0.9 % (FLUSH) 0.9 %
3-10 SYRINGE (ML) INJECTION AS NEEDED
Status: CANCELLED | OUTPATIENT
Start: 2022-05-17

## 2022-05-17 RX ORDER — LEVOTHYROXINE SODIUM 0.07 MG/1
75 TABLET ORAL DAILY
Status: ON HOLD | COMMUNITY
Start: 2022-03-12 | End: 2022-09-23

## 2022-05-17 RX ORDER — SCOLOPAMINE TRANSDERMAL SYSTEM 1 MG/1
1 PATCH, EXTENDED RELEASE TRANSDERMAL ONCE
Status: CANCELLED | OUTPATIENT
Start: 2022-05-17 | End: 2022-05-17

## 2022-05-17 RX ORDER — SODIUM CHLORIDE 9 MG/ML
150 INJECTION, SOLUTION INTRAVENOUS CONTINUOUS
Status: CANCELLED | OUTPATIENT
Start: 2022-05-17

## 2022-05-25 DIAGNOSIS — R06.00 DYSPNEA, UNSPECIFIED TYPE: Primary | ICD-10-CM

## 2022-05-26 ENCOUNTER — TELEPHONE (OUTPATIENT)
Dept: BARIATRICS/WEIGHT MGMT | Facility: CLINIC | Age: 52
End: 2022-05-26

## 2022-05-29 NOTE — PROGRESS NOTES
[facility] BARIATRIC SURGERY  [address]  [address]  [facility] [address]  [phone]      Patient  Name:  Emily Germain  :  1970      Date of Visit: 22    Chief Complaint:  weight gain; unable to maintain weight loss.   Evaluate for possible metabolic and bariatric surgery    History of Present Illness:  Emily Germain is a 52 y.o. female who presents today for evaluation, education and consultation regarding metabolic and bariatric surgery (MBS).  Since last seen 3/9/2022 she has gained roughly 10 pounds.  The patient returns for final visit prior to metabolic and bariatric surgery specifically the sleeve gastrectomy.  Original intake evaluation Miriam Dangelo PA-C dated 2021 reviewed.    She notes at that time the patient's maximum lifetime weight was 275 pounds and that day she weighed 259 pounds today she weighs 267 pounds.      The patient has had issues with morbid obesity for years and only temporary success with non-surgical methods of weight loss.  The patient is seeking LSG to help with the morbid obesity related conditions of anxiety and depression asymptomatic cholelithiasis, type II non-insulin-dependent diabetes mellitus, dyspnea exertion, peripheral edema, fatigue, GE reflux disease, hypertension, hyperlipidemia, hypothyroidism, knee pain, PCOS, asymptomatic leukocytosis, vitamin D deficiency.    52-year-old morbidly obese female from [facility].  Her  is with her for today's evaluation.  Her mother survived a saddle pulmonary embolism and was treated at [facility].  Her mother now takes daily aspirin.  The patient said she had COVID in January.      Past Medical History:   Diagnosis Date   • Anxiety and depression    • Cholelithiasis     dx 18+yrs ago, relatively asx   • Diabetes (HCC)     newly dx, no insulin, A1C >7   • Dyspepsia    • Dyspnea on exertion    • Edema     HCTZ 12.5mg daily   • Fatigue     • GERD (gastroesophageal reflux disease)     on Nexium x 5+yrs, improves w/ wt.loss, remote EGD (20+yrs ago) unremarkable   • HTN (hypertension)    • Hyperlipidemia    • Hypothyroidism    • Knee pain     on Mobic, prn steroid injections (next due 3/2022)   • Morbid obesity (HCC)    • PCOS (polycystic ovarian syndrome)     does not tolerate Metformin   • Vitamin D deficiency      Past Surgical History:   Procedure Laterality Date   • DILATATION AND CURETTAGE  1987    emergent, miscarriage @ 6mos   • ENDOMETRIAL ABLATION  2012       Allergies   Allergen Reactions   • Tuberculin Tests Swelling       Current Outpatient Medications:   •  busPIRone (BUSPAR) 7.5 MG tablet, Take 7.5 mg by mouth 3 (Three) Times a Day., Disp: , Rfl:   •  cloNIDine (CATAPRES) 0.1 MG tablet, TAKE 1 TABLET BY MOUTH TWICE DAILY AS NEEDED FOR BLOOD PRESSURE GREATER THAN 165/95, Disp: , Rfl:   •  esomeprazole (nexIUM) 40 MG capsule, Take 40 mg by mouth Every Morning Before Breakfast., Disp: , Rfl:   •  fexofenadine (ALLEGRA) 180 MG tablet, Take 180 mg by mouth Daily., Disp: , Rfl:   •  hydroCHLOROthiazide (MICROZIDE) 12.5 MG capsule, Take 12.5 mg by mouth Daily., Disp: , Rfl:   •  levothyroxine (SYNTHROID, LEVOTHROID) 75 MCG tablet, Take 75 mcg by mouth Daily., Disp: , Rfl:   •  losartan (COZAAR) 50 MG tablet, Take 50 mg by mouth Daily., Disp: , Rfl:   •  meloxicam (MOBIC) 15 MG tablet, Take 15 mg by mouth Daily., Disp: , Rfl:   •  multivitamin (THERAGRAN) tablet tablet, Take  by mouth., Disp: , Rfl:   •  pravastatin (PRAVACHOL) 20 MG tablet, Take 20 mg by mouth Daily., Disp: , Rfl:   •  Prenatal Vit-Fe Fumarate-FA (prenatal vitamin 27-0.8) 27-0.8 MG tablet tablet, , Disp: , Rfl:   •  rosuvastatin (CRESTOR) 10 MG tablet, , Disp: , Rfl:   •  SITagliptin (JANUVIA) 100 MG tablet, Take 100 mg by mouth Daily., Disp: , Rfl:   •  Tiadylt  MG 24 hr capsule, Take 180 mg by mouth Daily., Disp: , Rfl:   •  Zinc 100 MG tablet, Take  by mouth., Disp: ,  Rfl:   •  Enoxaparin Sodium (LOVENOX) 40 MG/0.4ML solution prefilled syringe syringe, Inject 0.4 mL under the skin into the appropriate area as directed Daily for 21 doses., Disp: 8.4 mL, Rfl: 0  •  levothyroxine (SYNTHROID, LEVOTHROID) 137 MCG tablet, Take  by mouth., Disp: , Rfl:     Social History     Socioeconomic History   • Marital status:    Tobacco Use   • Smoking status: Never Smoker   • Smokeless tobacco: Never Used   Substance and Sexual Activity   • Alcohol use: Never   • Drug use: Never   • Sexual activity: Yes     Partners: Male     Birth control/protection: None     Comment: My      Family History   Problem Relation Age of Onset   • Breast cancer Maternal Grandmother    • Hypertension Maternal Grandmother    • Cancer Maternal Grandmother         Breast   • Depression Maternal Grandmother         Bi-poler 1972   • Mental illness Maternal Grandmother    • Miscarriages / Stillbirths Maternal Grandmother    • Hypertension Mother    • Diabetes Father    • Hypertension Father    • Heart disease Father    • Hypertension Maternal Grandfather    • Heart attack Maternal Grandfather    • Heart disease Maternal Grandfather         Passed from massive MI   • Hypertension Paternal Grandmother    • Diabetes Paternal Grandfather    • Hypertension Paternal Grandfather    • Heart disease Maternal Uncle    • Miscarriages / Stillbirths Sister         1987 D& C       Review of Systems   Constitutional: Positive for fatigue and unexpected weight gain. Negative for chills, diaphoresis, fever and unexpected weight loss.   HENT: Negative for congestion and facial swelling.    Eyes: Negative for blurred vision, double vision and discharge.   Respiratory: Negative for chest tightness, shortness of breath and stridor.    Cardiovascular: Negative for chest pain, palpitations and leg swelling.   Gastrointestinal: Positive for GERD. Negative for blood in stool.   Endocrine: Negative for polydipsia.   Genitourinary:  Negative for hematuria.   Musculoskeletal: Positive for arthralgias.   Skin: Negative for color change.   Allergic/Immunologic: Negative for immunocompromised state.   Neurological: Negative for confusion.   Psychiatric/Behavioral: Negative for self-injury.       I have reviewed the ROS and confirm that it's accurate today.    Physical Exam:  Vital Signs:  Weight: 121 kg (267 lb)   Body mass index is 47.31 kg/m².  Temp: 98.4 °F (36.9 °C)   Heart Rate: 80   BP: 140/80     Physical Exam  Vitals reviewed.   Constitutional:       Appearance: She is well-developed.   HENT:      Head: Normocephalic and atraumatic.      Nose:      Comments: mask  Eyes:      Conjunctiva/sclera: Conjunctivae normal.      Pupils: Pupils are equal, round, and reactive to light.   Neck:      Thyroid: No thyromegaly.      Vascular: No carotid bruit.      Trachea: No tracheal deviation.   Cardiovascular:      Rate and Rhythm: Normal rate and regular rhythm.      Heart sounds: Normal heart sounds.   Pulmonary:      Effort: Pulmonary effort is normal. No respiratory distress.      Breath sounds: Normal breath sounds.   Abdominal:      General: There is no distension.      Palpations: Abdomen is soft.      Tenderness: There is no abdominal tenderness.   Musculoskeletal:         General: No deformity. Normal range of motion.      Cervical back: Normal range of motion and neck supple.   Skin:     General: Skin is warm and dry.      Findings: No rash.      Comments: Scattered tattoos   Neurological:      Mental Status: She is alert and oriented to person, place, and time.      Cranial Nerves: No cranial nerve deficit.      Coordination: Coordination normal.   Psychiatric:         Behavior: Behavior normal.         Thought Content: Thought content normal.         Judgment: Judgment normal.         Patient Active Problem List   Diagnosis   • GERD (gastroesophageal reflux disease)   • HTN (hypertension)   • Hypothyroidism   • Hyperlipidemia   • Edema   •  Diabetes ([facility])   • Knee pain   • Anxiety and depression   • Fatigue   • Dyspepsia   • Morbid obesity ([facility])   • Dyspnea on exertion   • PCOS (polycystic ovarian syndrome)   • Cholelithiasis   • Morbid obesity with body mass index (BMI) of 45.0 to 49.9 in adult ([facility])       Assessment:    Emily Germain is a 52 y.o. year old female with medically complicated obesity.    Metabolic and bariatric surgery is deemed medically necessary given the following obesity related comorbidities including anxiety and depression asymptomatic cholelithiasis, type II non-insulin-dependent diabetes mellitus, dyspnea exertion, peripheral edema, fatigue, GE reflux disease, hypertension, hyperlipidemia, hypothyroidism, knee pain, PCOS, asymptomatic leukocytosis, vitamin D deficiency with current Weight: 121 kg (267 lb) and Body mass index is 47.31 kg/m²..    Encounter Diagnoses   Name Primary?   • Morbid obesity with body mass index of 45.0-49.9 in adult ([facility]) Yes   • Cholelithiasis without cholecystitis       Patient is aware that surgery is a tool, and that weight loss and improvement in comorbidities is not guaranteed but only seen in the context of appropriate use, follow up and physical activity.    The patient was present for an approximately a 2.5 hour discussion of the purpose of MBS, how MBS is a tool to assist in achieving weight loss goals, the most common complications and how best to avoid them, and the strategies for short and long term weight loss and improvement in comorbidities.  Ample opportunity to discuss questions was available both in group and during the time of individual examination.    I reviewed her Mauricio report which is negative.  Labs dated 5/11/2022 white count 13.66 no differential performed elevated RDW of note hemoglobin 13.7 normal CMP except for glucose of 148.  Chest x-ray dated 5/11/2022 no active process.  EKG dated 3/9/2022 normal sinus rhythm with septal infarct age undetermined illegible  initials.  Psychosocial evaluation dated 12/16/2021 Chasidy Hope, PhD good candidate.  Dietitian evaluation dated 12/16/2021 Yesi padilla RD noting the patient has a longstanding habit of drinking 216 ounce bottles of regular Coke or red Mountain Dew daily and plans to give this up and her diet is very low on protein and lacks fruits and vegetables and is overall low in total calories and to few eating episodes.  Labs dated 2/23/2022 showing a white count of 14.8 hemoglobin A1c was 7.0 lipid panel normal except for triglycerides 196 HDL 30 vitamin D was low at 26.6 TSH and free T4 normal.  Labs dated 12/18/2021 normal CMP except for glucose of 159 elevated hemoglobin A1c 7.1 normal lipid panel except for triglycerides 323 HDL 29 VLDL 52.  Normal TSH.  Negative serum H. pylori IgA and IgG.  EGD dated 1/17/2022 showing diffuse gastritis no hiatal hernia Z-line 41 cm.  I noted at the time that I mistakenly said I performed one of her friends sleeve gastrectomies but she tells me today she only knows patients who have had gastric banding or bypass.  I noted she has a longstanding history of reflux and daily Nexium that controls her symptoms but if she forgets her Nexium she does have severe symptoms no dysphagia.  Pathology of the antrum showed mild chronic gastritis reactive gastropathy negative for H. pylori fundus biopsies minimal chronic gastritis with lymphoid aggregates and distal esophageal biopsies reflux esophagitis otherwise unremarkable.  Normal gastric emptying study dated 1/14/2022 with half emptying time of 74 minutes.  Ultrasound dated 1/14/2022 showing cholelithiasis with what is describes as a positive sonographic Tillman sign.  Cardiology clearance dated 3/9/2022 SNEHA Pantoja noting she had a recent echocardiogram.  Physical exam does not mention whether or not there is a murmur.  He cleared her at acceptable risk.  Please see scanned records that I have reviewed and signed off on today.  All of  "this in addition to the patient's unique history and exam has been taken into consideration in determining their appropriate candidacy for MBS.    Complications  of laparoscopic/possible robotic gastric sleeve were discussed. The patient is well aware of the potential complications of surgery that include but not limited to bleeding, infections, deep venous thrombosis, pulmonary embolism, pulmonary complications such as pneumonia, cardiac events, hernias, small bowel obstruction, damage to the spleen or other organs, bowel injury, disfiguring scars, failure to lose weight, need for additional surgery, conversion to an open procedure, and death. Patient is also aware of complications which apply in this particular procedure that can include but are not limited to a \"leak\" at the staple line which in some instances may require conversion to gastric bypass.    The patient is aware if a hiatal hernia is encountered, it likely will be repaired.  R/B/A Rx to hiatal hernia repair were discussed as outlined in our long consent form.  Briefly risks in addition to those for LSG include recurrent hernia, ALBIN, dysphagia, esophageal injury, pneumothorax, injury to the vagus nerves, injury to the thoracic duct, aorta or vena cava.    I discussed avoiding all tobacco products, nicotine,  and second hand smoke at least 2 weeks pre-operatively and 6 weeks post-operatively to minimize the risk of sleeve leak.  This included discussing the importance of avoiding even secondhand smoke as the risk of leak is increased.  Examples discussed:  Avoid going in a house or riding in a car where someone has previously smoked in the last 2 weeks and for 6 weeks postoperatively.  Avoid living in a house where someone smokes (even if it's in a separate room/patio/attached garage, etc.).   Avoid congregating with a group of people who are smoking even if it's outside.  It is OK to be around wood burning fires and barbecue.  I explained that I do " not know if marijuana has a same effects but my overall recommendation is to avoid it for 2 weeks prior in 6 weeks after surgery.     Discussed the risks, benefits and alternative therapies at great length as outlined in our extensive consent forms, consent videos, and educational teaching process under the direction of the center's .    A copy of the patient's signed informed consent is on file.    R/b/a rx discussed including but not limited to bleeding, infection, bile leak, bile duct injury, bowel injury, pulm complications, venothromboembolic events, death etc and wishes to proceed with concomitant lap jayson.      R/B/A Rx discussed to postop anticoagulation incl but not limited to bleeding, drug reaction, venothromboembolic events, etc. and agreeable to taking post op   lovenox.  Prescription 40 mg SQ Q AM #21       Plan: After evaluation today I think the patient is a reasonable candidate for laparoscopic sleeve gastrectomy, cholecystectomy, and EGD.  Type and screen.  Hold anti-inflammatories 1 week prior in 6 weeks after surgery to minimize the risk of delayed leak.  Other issues include anxiety and depression asymptomatic cholelithiasis, type II non-insulin-dependent diabetes mellitus, dyspnea exertion, peripheral edema, fatigue, GE reflux disease, hypertension, hyperlipidemia, hypothyroidism, knee pain, PCOS, asymptomatic leukocytosis, 10 pound weight gain since last seen and very poor dietary habits noted on dietitian evaluation.    Thank you ZAFAR Doss for the opportunity evaluate Mrs. Germain.        Jluis Del Valle MD

## 2022-06-06 ENCOUNTER — HOSPITAL ENCOUNTER (OUTPATIENT)
Dept: RESPIRATORY THERAPY | Facility: HOSPITAL | Age: 52
Discharge: HOME | End: 2022-06-06
Admitting: PHYSICIAN ASSISTANT

## 2022-06-06 DIAGNOSIS — R06.00 DYSPNEA, UNSPECIFIED TYPE: ICD-10-CM

## 2022-06-06 PROCEDURE — 94664 DEMO&/EVAL PT USE INHALER: CPT

## 2022-06-06 PROCEDURE — 94729 DIFFUSING CAPACITY: CPT

## 2022-06-06 PROCEDURE — 94060 EVALUATION OF WHEEZING: CPT

## 2022-06-06 PROCEDURE — 94060 EVALUATION OF WHEEZING: CPT | Performed by: INTERNAL MEDICINE

## 2022-06-06 PROCEDURE — 94726 PLETHYSMOGRAPHY LUNG VOLUMES: CPT | Performed by: INTERNAL MEDICINE

## 2022-06-06 PROCEDURE — 94729 DIFFUSING CAPACITY: CPT | Performed by: INTERNAL MEDICINE

## 2022-06-06 PROCEDURE — 94726 PLETHYSMOGRAPHY LUNG VOLUMES: CPT

## 2022-06-06 PROCEDURE — 94640 AIRWAY INHALATION TREATMENT: CPT

## 2022-06-06 RX ORDER — ALBUTEROL SULFATE 2.5 MG/3ML
2.5 SOLUTION RESPIRATORY (INHALATION) ONCE
Status: COMPLETED | OUTPATIENT
Start: 2022-06-06 | End: 2022-06-06

## 2022-06-06 RX ADMIN — ALBUTEROL SULFATE 2.5 MG: 2.5 SOLUTION RESPIRATORY (INHALATION) at 15:43

## 2022-06-13 ENCOUNTER — APPOINTMENT (OUTPATIENT)
Dept: RESPIRATORY THERAPY | Facility: HOSPITAL | Age: 52
End: 2022-06-13

## 2022-06-14 ENCOUNTER — TELEMEDICINE (OUTPATIENT)
Dept: BARIATRICS/WEIGHT MGMT | Facility: CLINIC | Age: 52
End: 2022-06-14

## 2022-06-14 ENCOUNTER — PRE-ADMISSION TESTING (OUTPATIENT)
Dept: PREADMISSION TESTING | Facility: HOSPITAL | Age: 52
End: 2022-06-14

## 2022-06-14 DIAGNOSIS — K80.20 CHOLELITHIASIS WITHOUT CHOLECYSTITIS: ICD-10-CM

## 2022-06-14 DIAGNOSIS — E66.01 MORBID OBESITY WITH BODY MASS INDEX OF 45.0-49.9 IN ADULT: ICD-10-CM

## 2022-06-14 DIAGNOSIS — E66.01 OBESITY, CLASS III, BMI 40-49.9 (MORBID OBESITY): Primary | ICD-10-CM

## 2022-06-14 LAB
ABO GROUP BLD: NORMAL
BLD GP AB SCN SERPL QL: NEGATIVE
DEPRECATED RDW RBC AUTO: 48.6 FL (ref 37–54)
ERYTHROCYTE [DISTWIDTH] IN BLOOD BY AUTOMATED COUNT: 14.7 % (ref 12.3–15.4)
HBA1C MFR BLD: 6.8 % (ref 4.8–5.6)
HCT VFR BLD AUTO: 43.6 % (ref 34–46.6)
HGB BLD-MCNC: 14.1 G/DL (ref 12–15.9)
MCH RBC QN AUTO: 29.1 PG (ref 26.6–33)
MCHC RBC AUTO-ENTMCNC: 32.3 G/DL (ref 31.5–35.7)
MCV RBC AUTO: 89.9 FL (ref 79–97)
MRSA DNA SPEC QL NAA+PROBE: NEGATIVE
PLATELET # BLD AUTO: 328 10*3/MM3 (ref 140–450)
PMV BLD AUTO: 10 FL (ref 6–12)
POTASSIUM SERPL-SCNC: 4 MMOL/L (ref 3.5–5.2)
RBC # BLD AUTO: 4.85 10*6/MM3 (ref 3.77–5.28)
RH BLD: POSITIVE
SARS-COV-2 RNA PNL SPEC NAA+PROBE: NOT DETECTED
T&S EXPIRATION DATE: NORMAL
WBC NRBC COR # BLD: 15.09 10*3/MM3 (ref 3.4–10.8)

## 2022-06-14 PROCEDURE — C9803 HOPD COVID-19 SPEC COLLECT: HCPCS

## 2022-06-14 PROCEDURE — 86900 BLOOD TYPING SEROLOGIC ABO: CPT

## 2022-06-14 PROCEDURE — 84132 ASSAY OF SERUM POTASSIUM: CPT

## 2022-06-14 PROCEDURE — 99214 OFFICE O/P EST MOD 30 MIN: CPT | Performed by: PHYSICIAN ASSISTANT

## 2022-06-14 PROCEDURE — 86850 RBC ANTIBODY SCREEN: CPT

## 2022-06-14 PROCEDURE — 85027 COMPLETE CBC AUTOMATED: CPT

## 2022-06-14 PROCEDURE — 86901 BLOOD TYPING SEROLOGIC RH(D): CPT

## 2022-06-14 PROCEDURE — 87641 MR-STAPH DNA AMP PROBE: CPT

## 2022-06-14 PROCEDURE — 36415 COLL VENOUS BLD VENIPUNCTURE: CPT

## 2022-06-14 PROCEDURE — U0004 COV-19 TEST NON-CDC HGH THRU: HCPCS

## 2022-06-14 PROCEDURE — 83036 HEMOGLOBIN GLYCOSYLATED A1C: CPT

## 2022-06-14 NOTE — PAT
Patient denies any current skin issues.     Per Anesthesia Request, patient instructed not to take their ACE/ARB medications on the AM of surgery.    Patient to apply Chlorhexadine wipes  to surgical area (as instructed) the night before procedure and the AM of procedure. Wipes provided.    Patient instructed to drink 20 ounces (or until full) of Gatorade and it needs to be completed 1 hour (for Main OR patients) or 2 hours (scheduled  section patients) before given arrival time for procedure (NO RED Gatorade)    Patient verbalized understanding.

## 2022-06-14 NOTE — PROGRESS NOTES
"[facility]  [address]  [address]  [facility] [id number] [phone]        Patient Name:  Emily Germain  :  1970    This visit was conducted as a video visit, in an effort to limit spread of the novel coronavirus during the COVID-19 pandemic.  The patient gave consent.      Date of Visit: 2022      Reason for Visit:   Weight gain; unable to maintain weight loss      HPI: Emily Germain is a 52 y.o. female pursuing MBS with Dr. Del Valle     Per Dr. Del Valle's consult 22:     \"Emily Germain is a 52 y.o. female who presents today for evaluation, education and consultation regarding metabolic and bariatric surgery (MBS).  Since last seen 3/9/2022 she has gained roughly 10 pounds.  The patient returns for final visit prior to metabolic and bariatric surgery specifically the sleeve gastrectomy.  Original intake evaluation Miriam Dangelo PA-C dated 2021 reviewed.    She notes at that time the patient's maximum lifetime weight was 275 pounds and that day she weighed 259 pounds today she weighs 267 pounds.       The patient has had issues with morbid obesity for years and only temporary success with non-surgical methods of weight loss.  The patient is seeking LSG to help with the morbid obesity related conditions of anxiety and depression asymptomatic cholelithiasis, type II non-insulin-dependent diabetes mellitus, dyspnea exertion, peripheral edema, fatigue, GE reflux disease, hypertension, hyperlipidemia, hypothyroidism, knee pain, PCOS, asymptomatic leukocytosis, vitamin D deficiency.     52-year-old morbidly obese female from [facility].  Her  is with her for today's evaluation.  Her mother survived a saddle pulmonary embolism and was treated at [facility].  Her mother now takes daily aspirin.  The patient said she had COVID in January.\"    Today's update 22:     Surgery was postponed due to " COVID-19, now ready to proceed. Feeling well with no concerns and eager to proceed. No recent illness or hospitalizations.  Patient has been on the liver shrinking preop diet. Denies any ASA, NSAIDS, steroids, tramadol, tobacco/ nicotine use/ exposure., herbal supplements, hormones, diet pills.    Denies dysphagia, reflux, nausea, vomiting, abdominal pain, pulmonary issues and fevers. Has postop anticoagulation ready to start postoperatively. Patient has vitamins and protein supplements ready for postop.     Last visit in office weight was 267lb and BMI 47.31          Past Medical History:   Diagnosis Date   • Anxiety and depression    • Cholelithiasis     dx 18+yrs ago, relatively asx   • Diabetes (HCC)     newly dx, no insulin, A1C >7   • Dyspepsia    • Dyspnea on exertion    • Edema     HCTZ 12.5mg daily   • Fatigue    • GERD (gastroesophageal reflux disease)     on Nexium x 5+yrs, improves w/ wt.loss, remote EGD (20+yrs ago) unremarkable   • HTN (hypertension)    • Hyperlipidemia    • Hypothyroidism    • Knee pain     on Mobic, prn steroid injections (next due 3/2022)   • Morbid obesity (HCC)    • PCOS (polycystic ovarian syndrome)     does not tolerate Metformin   • Vitamin D deficiency      Past Surgical History:   Procedure Laterality Date   • DILATATION AND CURETTAGE  1987    emergent, miscarriage @ 6mos   • ENDOMETRIAL ABLATION  2012     Outpatient Medications Marked as Taking for the 6/14/22 encounter (Telemedicine) with Malgorzata Chung PA   Medication Sig Dispense Refill   • busPIRone (BUSPAR) 7.5 MG tablet Take 7.5 mg by mouth 3 (Three) Times a Day.     • cloNIDine (CATAPRES) 0.1 MG tablet TAKE 1 TABLET BY MOUTH TWICE DAILY AS NEEDED FOR BLOOD PRESSURE GREATER THAN 165/95     • esomeprazole (nexIUM) 40 MG capsule Take 40 mg by mouth Every Morning Before Breakfast.     • fexofenadine (ALLEGRA) 180 MG tablet Take 180 mg by mouth Daily.     • hydroCHLOROthiazide (MICROZIDE) 12.5 MG capsule Take 12.5 mg by  mouth Daily.     • levothyroxine (SYNTHROID, LEVOTHROID) 137 MCG tablet Take  by mouth.     • levothyroxine (SYNTHROID, LEVOTHROID) 75 MCG tablet Take 75 mcg by mouth Daily.     • losartan (COZAAR) 50 MG tablet Take 50 mg by mouth Daily.     • multivitamin (THERAGRAN) tablet tablet Take  by mouth.     • Prenatal Vit-Fe Fumarate-FA (prenatal vitamin 27-0.8) 27-0.8 MG tablet tablet      • rosuvastatin (CRESTOR) 10 MG tablet      • SITagliptin (JANUVIA) 100 MG tablet Take 100 mg by mouth Daily.     • Tiadylt  MG 24 hr capsule Take 180 mg by mouth Daily.     • Zinc 100 MG tablet Take  by mouth.         Allergies   Allergen Reactions   • Tuberculin Tests Swelling       Social History     Socioeconomic History   • Marital status:    Tobacco Use   • Smoking status: Never Smoker   • Smokeless tobacco: Never Used   Substance and Sexual Activity   • Alcohol use: Never   • Drug use: Never   • Sexual activity: Yes     Partners: Male     Birth control/protection: None     Comment: My      Social History     Social History Narrative    .  Lives in [place].  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 Denies fever, chills, unintentional weight loss   HEENT: Denies nasal congestion, change in vision, ear pain, change in hearing, soar throat   CARDIAC: Denies chest pain, palpitations, edema   RESP: denies shortness of breath, cough, wheezing   GI: Denies abdominal pain, nausea, vomiting, diarrhea, constipation, reflux   Urinary: Denies polyuria, dysuria, hematuria   MSK: denies joint pain, swelling, gout, joint stiffness   Neuro: Denies seizures, weakness, numbness/tingling, LOC   Heme/Endo: denies bleeding, bruising, heat/cold intolerance, sweating   Psych: denies depression, anxiety    There were no vitals taken for this visit.    Physical Exam  Constitutional:       General:  "She is not in acute distress.  Pulmonary:      Effort: Pulmonary effort is normal.   Neurological:      Mental Status: She is alert and oriented to person, place, and time.   Psychiatric:         Mood and Affect: Mood normal.         Behavior: Behavior normal.         Thought Content: Thought content normal.         Judgment: Judgment normal.           Assessment:      ICD-10-CM ICD-9-CM   1. Obesity, Class III, BMI 40-49.9 (morbid obesity) ([facility])  E66.01 278.01        Per Dr. Del Valle's consult 5/17/22:     \"Emily Germain is a 52 y.o. year old female with medically complicated obesity.     Metabolic and bariatric surgery is deemed medically necessary given the following obesity related comorbidities including anxiety and depression asymptomatic cholelithiasis, type II non-insulin-dependent diabetes mellitus, dyspnea exertion, peripheral edema, fatigue, GE reflux disease, hypertension, hyperlipidemia, hypothyroidism, knee pain, PCOS, asymptomatic leukocytosis, vitamin D deficiency with current Weight: 121 kg (267 lb) and Body mass index is 47.31 kg/m²..          Encounter Diagnoses   Name Primary?   • Morbid obesity with body mass index of 45.0-49.9 in adult ([facility]) Yes   • Cholelithiasis without cholecystitis        Patient is aware that surgery is a tool, and that weight loss and improvement in comorbidities is not guaranteed but only seen in the context of appropriate use, follow up and physical activity.     The patient was present for an approximately a 2.5 hour discussion of the purpose of MBS, how MBS is a tool to assist in achieving weight loss goals, the most common complications and how best to avoid them, and the strategies for short and long term weight loss and improvement in comorbidities.  Ample opportunity to discuss questions was available both in group and during the time of individual examination.     I reviewed her Mauricio report which is negative.  Labs dated 5/11/2022 white count 13.66 no " differential performed elevated RDW of note hemoglobin 13.7 normal CMP except for glucose of 148.  Chest x-ray dated 5/11/2022 no active process.  EKG dated 3/9/2022 normal sinus rhythm with septal infarct age undetermined illegible initials.  Psychosocial evaluation dated 12/16/2021 Chasidy Hope, PhD good candidate.  Dietitian evaluation dated 12/16/2021 Yesi padilla RD noting the patient has a longstanding habit of drinking 216 ounce bottles of regular Coke or red Mountain Dew daily and plans to give this up and her diet is very low on protein and lacks fruits and vegetables and is overall low in total calories and to few eating episodes.  Labs dated 2/23/2022 showing a white count of 14.8 hemoglobin A1c was 7.0 lipid panel normal except for triglycerides 196 HDL 30 vitamin D was low at 26.6 TSH and free T4 normal.  Labs dated 12/18/2021 normal CMP except for glucose of 159 elevated hemoglobin A1c 7.1 normal lipid panel except for triglycerides 323 HDL 29 VLDL 52.  Normal TSH.  Negative serum H. pylori IgA and IgG.  EGD dated 1/17/2022 showing diffuse gastritis no hiatal hernia Z-line 41 cm.  I noted at the time that I mistakenly said I performed one of her friends sleeve gastrectomies but she tells me today she only knows patients who have had gastric banding or bypass.  I noted she has a longstanding history of reflux and daily Nexium that controls her symptoms but if she forgets her Nexium she does have severe symptoms no dysphagia.  Pathology of the antrum showed mild chronic gastritis reactive gastropathy negative for H. pylori fundus biopsies minimal chronic gastritis with lymphoid aggregates and distal esophageal biopsies reflux esophagitis otherwise unremarkable.  Normal gastric emptying study dated 1/14/2022 with half emptying time of 74 minutes.  Ultrasound dated 1/14/2022 showing cholelithiasis with what is describes as a positive sonographic Tillman sign.  Cardiology clearance dated 3/9/2022 Neel  "SNEHA Johnson noting she had a recent echocardiogram.  Physical exam does not mention whether or not there is a murmur.  He cleared her at acceptable risk.  Please see scanned records that I have reviewed and signed off on today.  All of this in addition to the patient's unique history and exam has been taken into consideration in determining their appropriate candidacy for MBS.     Complications  of laparoscopic/possible robotic gastric sleeve were discussed. The patient is well aware of the potential complications of surgery that include but not limited to bleeding, infections, deep venous thrombosis, pulmonary embolism, pulmonary complications such as pneumonia, cardiac events, hernias, small bowel obstruction, damage to the spleen or other organs, bowel injury, disfiguring scars, failure to lose weight, need for additional surgery, conversion to an open procedure, and death. Patient is also aware of complications which apply in this particular procedure that can include but are not limited to a \"leak\" at the staple line which in some instances may require conversion to gastric bypass.     The patient is aware if a hiatal hernia is encountered, it likely will be repaired.  R/B/A Rx to hiatal hernia repair were discussed as outlined in our long consent form.  Briefly risks in addition to those for LSG include recurrent hernia, ALBIN, dysphagia, esophageal injury, pneumothorax, injury to the vagus nerves, injury to the thoracic duct, aorta or vena cava.     I discussed avoiding all tobacco products, nicotine,  and second hand smoke at least 2 weeks pre-operatively and 6 weeks post-operatively to minimize the risk of sleeve leak.  This included discussing the importance of avoiding even secondhand smoke as the risk of leak is increased.  Examples discussed:  Avoid going in a house or riding in a car where someone has previously smoked in the last 2 weeks and for 6 weeks postoperatively.  Avoid living in a house where " "someone smokes (even if it's in a separate room/patio/attached garage, etc.).   Avoid congregating with a group of people who are smoking even if it's outside.  It is OK to be around wood burning fires and barbecue.  I explained that I do not know if marijuana has a same effects but my overall recommendation is to avoid it for 2 weeks prior in 6 weeks after surgery.      Discussed the risks, benefits and alternative therapies at great length as outlined in our extensive consent forms, consent videos, and educational teaching process under the direction of the center's .     A copy of the patient's signed informed consent is on file.     R/b/a rx discussed including but not limited to bleeding, infection, bile leak, bile duct injury, bowel injury, pulm complications, venothromboembolic events, death etc and wishes to proceed with concomitant lap jayson.       R/B/A Rx discussed to postop anticoagulation incl but not limited to bleeding, drug reaction, venothromboembolic events, etc. and agreeable to taking post op   lovenox.  Prescription 40 mg SQ Q AM #21\"      Class 3 Severe Obesity (BMI >=40). Obesity-related health conditions include the following: as above . Obesity is worsening. BMI is is above average; BMI management plan is completed. We discussed consulting a Bariatric surgeon.      Plan:      Per Dr. Del Valle's consult 5/17/22:     \"After evaluation today I think the patient is a reasonable candidate for laparoscopic sleeve gastrectomy, cholecystectomy, and EGD.  Type and screen.  Hold anti-inflammatories 1 week prior in 6 weeks after surgery to minimize the risk of delayed leak.  Other issues include anxiety and depression asymptomatic cholelithiasis, type II non-insulin-dependent diabetes mellitus, dyspnea exertion, peripheral edema, fatigue, GE reflux disease, hypertension, hyperlipidemia, hypothyroidism, knee pain, PCOS, asymptomatic leukocytosis, 10 pound weight gain since last seen and " "very poor dietary habits noted on dietitian evaluation.\"    Will proceed with laparoscopic sleeve gastrectomy, cholecystectomy, and EGD. R/b/a rx discussed including but not limited to bleeding, infection, damage to surrounding structures including leak from staple line,  bowel injury, pulm complications, venothromboembolic events, death etc and wishes to proceed with LSG.  Continue on pre-op diet as discussed. Avoid ASA, NSAIDs, steroids, tramadol, OCPs, diet pills, tobacco/ nicotine use/ exposure.  Follow up postop as directed. Advised having postop vitamins and protein supplements ready for home d/c. Patient has anticoagulation to start postoperatively.  Call with questions/ concerns.    R/b/a rx discussed including but not limited to bleeding, infection, bile leak, bile duct injury, bowel injury, pulm complications, venothromboembolic events, death etc and wishes to proceed with lap jayson.  Aware may not alleviate symptoms and further work up may be warranted.      "

## 2022-06-14 NOTE — DISCHARGE INSTRUCTIONS
Patient to apply Chlorhexadine wipes  to surgical area (as instructed) the night before procedure and the AM of procedure. Wipes provided.     Patient instructed to drink 20 ounces (or until full) of Gatorade and it needs to be completed 1 hour (for Main OR patients) or 2 hours (scheduled  section patients) before given arrival time for procedure (NO RED Gatorade)    Patient verbalized understanding.     Per Anesthesia Request, patient instructed not to take their ACE/ARB medications on the AM of surgery.     Patient viewed general PAT education video as instructed in their preoperative information received from their surgeon.  Patient stated the general PAT education video was viewed in its entirety and survey completed.  Copies of PAT general education handouts (Incentive Spirometry, Meds to Beds Program, Patient Belongings, Pre-op skin preparation instructions, Blood Glucose testing, Visitor policy, Surgery FAQ, Code H) distributed to patient if not printed. Education related to the PAT pass and skin preparation for surgery (if applicable) completed in PAT as a reinforcement to PAT education video. Patient instructed to return PAT pass provided today as well as completed skin preparation sheet (if applicable) on the day of procedure.     Additionally if patient had not viewed video yet but intended to view it at home or in our waiting area, then referred them to the handout with QR code/link provided during PAT visit.  Instructed patient to complete survey after viewing the video in its entirety.  Encouraged patient/family to read PAT general education handouts thoroughly and notify PAT staff with any questions or concerns. Patient verbalized understanding of all information and priority content.

## 2022-06-16 RX ORDER — FAMOTIDINE 10 MG/ML
20 INJECTION, SOLUTION INTRAVENOUS ONCE
Status: CANCELLED | OUTPATIENT
Start: 2022-06-16 | End: 2022-06-16

## 2022-08-31 DIAGNOSIS — E66.01 MORBID OBESITY: Primary | ICD-10-CM

## 2022-09-14 ENCOUNTER — LAB (OUTPATIENT)
Dept: LAB | Facility: HOSPITAL | Age: 52
End: 2022-09-14

## 2022-09-14 PROCEDURE — 80053 COMPREHEN METABOLIC PANEL: CPT | Performed by: PHYSICIAN ASSISTANT

## 2022-09-14 PROCEDURE — 85025 COMPLETE CBC W/AUTO DIFF WBC: CPT | Performed by: PHYSICIAN ASSISTANT

## 2022-09-20 ENCOUNTER — PRE-ADMISSION TESTING (OUTPATIENT)
Dept: PREADMISSION TESTING | Facility: HOSPITAL | Age: 52
End: 2022-09-20

## 2022-09-20 ENCOUNTER — TELEMEDICINE (OUTPATIENT)
Dept: BARIATRICS/WEIGHT MGMT | Facility: CLINIC | Age: 52
End: 2022-09-20

## 2022-09-20 DIAGNOSIS — K80.20 CHOLELITHIASIS WITHOUT CHOLECYSTITIS: ICD-10-CM

## 2022-09-20 DIAGNOSIS — R53.83 FATIGUE, UNSPECIFIED TYPE: ICD-10-CM

## 2022-09-20 DIAGNOSIS — E66.01 OBESITY, CLASS III, BMI 40-49.9 (MORBID OBESITY): Primary | ICD-10-CM

## 2022-09-20 LAB
DEPRECATED RDW RBC AUTO: 47.6 FL (ref 37–54)
ERYTHROCYTE [DISTWIDTH] IN BLOOD BY AUTOMATED COUNT: 15.1 % (ref 12.3–15.4)
HBA1C MFR BLD: 7 % (ref 4.8–5.6)
HCT VFR BLD AUTO: 43.1 % (ref 34–46.6)
HGB BLD-MCNC: 13.8 G/DL (ref 12–15.9)
MCH RBC QN AUTO: 27.8 PG (ref 26.6–33)
MCHC RBC AUTO-ENTMCNC: 32 G/DL (ref 31.5–35.7)
MCV RBC AUTO: 86.7 FL (ref 79–97)
PLATELET # BLD AUTO: 321 10*3/MM3 (ref 140–450)
PMV BLD AUTO: 9.8 FL (ref 6–12)
POTASSIUM SERPL-SCNC: 4.6 MMOL/L (ref 3.5–5.2)
QT INTERVAL: 394 MS
QTC INTERVAL: 428 MS
RBC # BLD AUTO: 4.97 10*6/MM3 (ref 3.77–5.28)
WBC NRBC COR # BLD: 13.72 10*3/MM3 (ref 3.4–10.8)

## 2022-09-20 PROCEDURE — 93010 ELECTROCARDIOGRAM REPORT: CPT | Performed by: INTERNAL MEDICINE

## 2022-09-20 PROCEDURE — 93005 ELECTROCARDIOGRAM TRACING: CPT

## 2022-09-20 PROCEDURE — 36415 COLL VENOUS BLD VENIPUNCTURE: CPT

## 2022-09-20 PROCEDURE — 83036 HEMOGLOBIN GLYCOSYLATED A1C: CPT

## 2022-09-20 PROCEDURE — 85027 COMPLETE CBC AUTOMATED: CPT

## 2022-09-20 PROCEDURE — 84132 ASSAY OF SERUM POTASSIUM: CPT

## 2022-09-20 PROCEDURE — 99442 PR PHYS/QHP TELEPHONE EVALUATION 11-20 MIN: CPT | Performed by: PHYSICIAN ASSISTANT

## 2022-09-20 RX ORDER — DILTIAZEM HYDROCHLORIDE 180 MG/1
180 CAPSULE, COATED, EXTENDED RELEASE ORAL DAILY
COMMUNITY

## 2022-09-20 NOTE — PROGRESS NOTES
"[facility] Bariatric Surgery  [address]  [address]  [facility]ly [facility] [phone]    This visit was attempted to be conducted as a video visit, but due to technical difficulties, this was changed to a telephone encounter in an effort to limit spread of the novel coronavirus during the COVID-19 pandemic.  The patient gave consent.      Patient Name:  Emily Germain  :  1970      Date of Visit: 2022      Reason for Visit:   Weight gain; unable to maintain weight loss    HPI: Emily Germain is a 52 y.o. female pursuing MBS with Dr. Del Valle.    Per Dr. Del Valle's consult 2022:    \"Emily Germain is a 52 y.o. female who presents today for evaluation, education and consultation regarding metabolic and bariatric surgery (MBS).  Since last seen 3/9/2022 she has gained roughly 10 pounds.  The patient returns for final visit prior to metabolic and bariatric surgery specifically the sleeve gastrectomy.  Original intake evaluation Miriam Dangelo PA-C dated 2021 reviewed.    She notes at that time the patient's maximum lifetime weight was 275 pounds and that day she weighed 259 pounds today she weighs 267 pounds.       The patient has had issues with morbid obesity for years and only temporary success with non-surgical methods of weight loss.  The patient is seeking LSG to help with the morbid obesity related conditions of anxiety and depression asymptomatic cholelithiasis, type II non-insulin-dependent diabetes mellitus, dyspnea exertion, peripheral edema, fatigue, GE reflux disease, hypertension, hyperlipidemia, hypothyroidism, knee pain, PCOS, asymptomatic leukocytosis, vitamin D deficiency.     52-year-old morbidly obese female from [facility].  Her  is with her for today's evaluation.  Her mother survived a saddle pulmonary embolism and was treated at [facility].  Her mother now takes daily aspirin.  The patient " "said she had COVID in January.\"    Today's update:    Surgery was postponed due to COVID-19, now ready to proceed. Feeling well with no concerns and eager to proceed.   Patient has been on the liver shrinking preop diet. Denies any ASA, NSAIDS, steroids, tramadol, tobacco/ nicotine use/ exposure., herbal supplements, hormones, diet pills.    Denies dysphagia, reflux, nausea, vomiting, abdominal pain, pulmonary issues and fevers. Has postop anticoagulation ready to start postoperatively. Patient has vitamins and protein supplements ready for postop.     Last visit in office weight was 267lb and BMI 47.31      Past Medical History:   Diagnosis Date   • Anxiety and depression    • Cholelithiasis     dx 18+yrs ago, relatively asx   • Diabetes (HCC)     newly dx, no insulin, A1C >7   • Dyspepsia    • Dyspnea on exertion    • Edema     HCTZ 12.5mg daily   • Fatigue    • Gallstone    • GERD (gastroesophageal reflux disease)     on Nexium x 5+yrs, improves w/ wt.loss, remote EGD (20+yrs ago) unremarkable   • HTN (hypertension)    • Hyperlipidemia    • Hypothyroidism    • Knee pain     on Mobic, prn steroid injections (next due 3/2022)   • Morbid obesity (HCC)    • PCOS (polycystic ovarian syndrome)     does not tolerate Metformin   • Vitamin D deficiency    • Wears contact lenses    • Wears reading eyeglasses      Past Surgical History:   Procedure Laterality Date   • DILATATION AND CURETTAGE  1987    emergent, miscarriage @ 6mos   • ENDOMETRIAL ABLATION  2012   • ENDOSCOPY     • WISDOM TOOTH EXTRACTION       Outpatient Medications Marked as Taking for the 9/20/22 encounter (Telemedicine) with Malgorzata Chung PA   Medication Sig Dispense Refill   • busPIRone (BUSPAR) 7.5 MG tablet Take 7.5 mg by mouth 2 (Two) Times a Day.     • cloNIDine (CATAPRES) 0.1 MG tablet Take 0.1 mg by mouth As Needed for High Blood Pressure (greater than 165/95).     • esomeprazole (nexIUM) 40 MG capsule Take 40 mg by mouth Every Morning Before " Breakfast.     • fexofenadine (ALLEGRA) 180 MG tablet Take 180 mg by mouth Daily.     • hydroCHLOROthiazide (MICROZIDE) 12.5 MG capsule Take 12.5 mg by mouth Daily.     • levothyroxine (SYNTHROID, LEVOTHROID) 75 MCG tablet Take 75 mcg by mouth Daily.     • levothyroxine (SYNTHROID, LEVOTHROID) 75 MCG tablet Take 75 mcg by mouth Daily.     • losartan (COZAAR) 50 MG tablet Take 50 mg by mouth 2 (Two) Times a Day.     • Prenatal Vit-Fe Fumarate-FA (prenatal vitamin 27-0.8) 27-0.8 MG tablet tablet Take 1 tablet by mouth Daily.     • rosuvastatin (CRESTOR) 10 MG tablet Take 10 mg by mouth Daily.     • SITagliptin (JANUVIA) 100 MG tablet Take 100 mg by mouth Daily.     • Zinc 100 MG tablet Take 50 mg by mouth 2 (Two) Times a Day.         Allergies   Allergen Reactions   • Tuberculin Tests Swelling       Social History     Socioeconomic History   • Marital status:    Tobacco Use   • Smoking status: Never Smoker   • Smokeless tobacco: Never Used   Vaping Use   • Vaping Use: Never used   Substance and Sexual Activity   • Alcohol use: Not Currently     Comment: rare   • Drug use: Never   • Sexual activity: Yes     Partners: Male     Birth control/protection: None     Comment: My      Social History     Social History Narrative    .  Lives in [facility].  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 Denies fever, chills, unintentional weight loss   HEENT: Denies nasal congestion, change in vision, ear pain, change in hearing, soar throat   CARDIAC: Denies chest pain, palpitations, edema   RESP: denies shortness of breath, cough, wheezing   GI: Denies abdominal pain, nausea, vomiting, diarrhea, constipation, reflux   Urinary: Denies polyuria, dysuria, hematuria   MSK: denies joint pain, swelling, gout, joint stiffness   Neuro: Denies seizures, weakness, numbness/tingling, LOC  "  Heme/Endo: denies bleeding, bruising, heat/cold intolerance, sweating   Psych: denies depression, anxiety    There were no vitals taken for this visit.    Physical Exam  Neurological:      Mental Status: She is alert and oriented to person, place, and time.   Psychiatric:         Mood and Affect: Mood normal.         Behavior: Behavior normal.         Thought Content: Thought content normal.         Judgment: Judgment normal.           Assessment:      ICD-10-CM ICD-9-CM   1. Obesity, Class III, BMI 40-49.9 (morbid obesity) ([facility])  E66.01 278.01   2. Fatigue, unspecified type  R53.83 780.79   3. Cholelithiasis without cholecystitis  K80.20 574.20        Per Dr. Del Valle's consult 5/17/2022:    \"Emily Germain is a 52 y.o. year old female with medically complicated obesity.     Metabolic and bariatric surgery is deemed medically necessary given the following obesity related comorbidities including anxiety and depression asymptomatic cholelithiasis, type II non-insulin-dependent diabetes mellitus, dyspnea exertion, peripheral edema, fatigue, GE reflux disease, hypertension, hyperlipidemia, hypothyroidism, knee pain, PCOS, asymptomatic leukocytosis, vitamin D deficiency with current Weight: 121 kg (267 lb) and Body mass index is 47.31 kg/m²..          Encounter Diagnoses   Name Primary?   • Morbid obesity with body mass index of 45.0-49.9 in adult ([facility]) Yes   • Cholelithiasis without cholecystitis        Patient is aware that surgery is a tool, and that weight loss and improvement in comorbidities is not guaranteed but only seen in the context of appropriate use, follow up and physical activity.     The patient was present for an approximately a 2.5 hour discussion of the purpose of MBS, how MBS is a tool to assist in achieving weight loss goals, the most common complications and how best to avoid them, and the strategies for short and long term weight loss and improvement in comorbidities.  Ample opportunity to " discuss questions was available both in group and during the time of individual examination.     I reviewed her Mauricio report which is negative.  Labs dated 5/11/2022 white count 13.66 no differential performed elevated RDW of note hemoglobin 13.7 normal CMP except for glucose of 148.  Chest x-ray dated 5/11/2022 no active process.  EKG dated 3/9/2022 normal sinus rhythm with septal infarct age undetermined illegible initials.  Psychosocial evaluation dated 12/16/2021 Chasidy Hope, PhD good candidate.  Dietitian evaluation dated 12/16/2021 Yesi padilla RD noting the patient has a longstanding habit of drinking 216 ounce bottles of regular Coke or red Mountain Dew daily and plans to give this up and her diet is very low on protein and lacks fruits and vegetables and is overall low in total calories and to few eating episodes.  Labs dated 2/23/2022 showing a white count of 14.8 hemoglobin A1c was 7.0 lipid panel normal except for triglycerides 196 HDL 30 vitamin D was low at 26.6 TSH and free T4 normal.  Labs dated 12/18/2021 normal CMP except for glucose of 159 elevated hemoglobin A1c 7.1 normal lipid panel except for triglycerides 323 HDL 29 VLDL 52.  Normal TSH.  Negative serum H. pylori IgA and IgG.  EGD dated 1/17/2022 showing diffuse gastritis no hiatal hernia Z-line 41 cm.  I noted at the time that I mistakenly said I performed one of her friends sleeve gastrectomies but she tells me today she only knows patients who have had gastric banding or bypass.  I noted she has a longstanding history of reflux and daily Nexium that controls her symptoms but if she forgets her Nexium she does have severe symptoms no dysphagia.  Pathology of the antrum showed mild chronic gastritis reactive gastropathy negative for H. pylori fundus biopsies minimal chronic gastritis with lymphoid aggregates and distal esophageal biopsies reflux esophagitis otherwise unremarkable.  Normal gastric emptying study dated 1/14/2022 with half  "emptying time of 74 minutes.  Ultrasound dated 1/14/2022 showing cholelithiasis with what is describes as a positive sonographic Tillman sign.  Cardiology clearance dated 3/9/2022 SNEHA Pantoja noting she had a recent echocardiogram.  Physical exam does not mention whether or not there is a murmur.  He cleared her at acceptable risk.  Please see scanned records that I have reviewed and signed off on today.  All of this in addition to the patient's unique history and exam has been taken into consideration in determining their appropriate candidacy for MBS.     Complications  of laparoscopic/possible robotic gastric sleeve were discussed. The patient is well aware of the potential complications of surgery that include but not limited to bleeding, infections, deep venous thrombosis, pulmonary embolism, pulmonary complications such as pneumonia, cardiac events, hernias, small bowel obstruction, damage to the spleen or other organs, bowel injury, disfiguring scars, failure to lose weight, need for additional surgery, conversion to an open procedure, and death. Patient is also aware of complications which apply in this particular procedure that can include but are not limited to a \"leak\" at the staple line which in some instances may require conversion to gastric bypass.     The patient is aware if a hiatal hernia is encountered, it likely will be repaired.  R/B/A Rx to hiatal hernia repair were discussed as outlined in our long consent form.  Briefly risks in addition to those for LSG include recurrent hernia, ALBIN, dysphagia, esophageal injury, pneumothorax, injury to the vagus nerves, injury to the thoracic duct, aorta or vena cava.     I discussed avoiding all tobacco products, nicotine,  and second hand smoke at least 2 weeks pre-operatively and 6 weeks post-operatively to minimize the risk of sleeve leak.  This included discussing the importance of avoiding even secondhand smoke as the risk of leak is increased.  " "Examples discussed:  Avoid going in a house or riding in a car where someone has previously smoked in the last 2 weeks and for 6 weeks postoperatively.  Avoid living in a house where someone smokes (even if it's in a separate room/patio/attached garage, etc.).   Avoid congregating with a group of people who are smoking even if it's outside.  It is OK to be around wood burning fires and barbecue.  I explained that I do not know if marijuana has a same effects but my overall recommendation is to avoid it for 2 weeks prior in 6 weeks after surgery.      Discussed the risks, benefits and alternative therapies at great length as outlined in our extensive consent forms, consent videos, and educational teaching process under the direction of the center's .     A copy of the patient's signed informed consent is on file.     R/b/a rx discussed including but not limited to bleeding, infection, bile leak, bile duct injury, bowel injury, pulm complications, venothromboembolic events, death etc and wishes to proceed with concomitant lap jayson.       R/B/A Rx discussed to postop anticoagulation incl but not limited to bleeding, drug reaction, venothromboembolic events, etc. and agreeable to taking post op   lovenox.  Prescription 40 mg SQ Q AM #21\"      Class 3 Severe Obesity (BMI >=40). Obesity-related health conditions include the following: as above . Obesity is worsening. BMI is is above average; BMI management plan is completed. We discussed consulting a Bariatric surgeon.      Plan:      Per Dr. Del Valle's consult 5/17/2022:    \"After evaluation today I think the patient is a reasonable candidate for laparoscopic sleeve gastrectomy, cholecystectomy, and EGD.  Type and screen.  Hold anti-inflammatories 1 week prior in 6 weeks after surgery to minimize the risk of delayed leak.  Other issues include anxiety and depression asymptomatic cholelithiasis, type II non-insulin-dependent diabetes mellitus, dyspnea " "exertion, peripheral edema, fatigue, GE reflux disease, hypertension, hyperlipidemia, hypothyroidism, knee pain, PCOS, asymptomatic leukocytosis, 10 pound weight gain since last seen and very poor dietary habits noted on dietitian evaluation.\"    Will proceed with laparoscopic sleeve gastrectomy, cholecystectomy, and EGD. R/b/a rx discussed including but not limited to bleeding, infection, damage to surrounding structures including leak from staple line,  bowel injury, pulm complications, venothromboembolic events, death etc and wishes to proceed with LSG.  Continue on pre-op diet as discussed. Avoid ASA, NSAIDs, steroids, tramadol, OCPs, diet pills, tobacco/ nicotine use/ exposure.  Follow up postop as directed. Advised having postop vitamins and protein supplements ready for home d/c. Patient has anticoagulation to start postoperatively.  Call with questions/ concerns.    R/b/a rx discussed including but not limited to bleeding, infection, bile leak, bile duct injury, bowel injury, pulm complications, venothromboembolic events, death etc and wishes to proceed with lap jayson.  Aware may not alleviate symptoms and further work up may be warranted.    I spent 15 minutes in direct communication with the patient.     "

## 2022-09-23 ENCOUNTER — ANESTHESIA EVENT CONVERTED (OUTPATIENT)
Dept: ANESTHESIOLOGY | Facility: HOSPITAL | Age: 52
End: 2022-09-23

## 2022-09-23 ENCOUNTER — ANESTHESIA EVENT (OUTPATIENT)
Dept: PERIOP | Facility: HOSPITAL | Age: 52
End: 2022-09-23

## 2022-09-23 ENCOUNTER — ANESTHESIA (OUTPATIENT)
Dept: PERIOP | Facility: HOSPITAL | Age: 52
End: 2022-09-23

## 2022-09-23 ENCOUNTER — HOSPITAL ENCOUNTER (INPATIENT)
Facility: HOSPITAL | Age: 52
LOS: 1 days | Discharge: HOME | End: 2022-09-24
Attending: SURGERY | Admitting: SURGERY

## 2022-09-23 DIAGNOSIS — E66.01 MORBID OBESITY WITH BODY MASS INDEX OF 45.0-49.9 IN ADULT: ICD-10-CM

## 2022-09-23 DIAGNOSIS — E66.01 MORBID OBESITY WITH BODY MASS INDEX (BMI) OF 45.0 TO 49.9 IN ADULT: Primary | ICD-10-CM

## 2022-09-23 DIAGNOSIS — K80.20 CHOLELITHIASIS WITHOUT CHOLECYSTITIS: ICD-10-CM

## 2022-09-23 LAB
ABO GROUP BLD: NORMAL
B-HCG UR QL: NEGATIVE
BLD GP AB SCN SERPL QL: POSITIVE
EXPIRATION DATE: NORMAL
GLUCOSE BLDC GLUCOMTR-MCNC: 103 MG/DL (ref 70–130)
GLUCOSE BLDC GLUCOMTR-MCNC: 148 MG/DL (ref 70–130)
GLUCOSE BLDC GLUCOMTR-MCNC: 154 MG/DL (ref 70–130)
GLUCOSE BLDC GLUCOMTR-MCNC: 184 MG/DL (ref 70–130)
INTERNAL NEGATIVE CONTROL: NORMAL
INTERNAL POSITIVE CONTROL: NORMAL
Lab: NORMAL
NONSPECIFIC COLD ANTIBODY: NORMAL
RH BLD: POSITIVE
T&S EXPIRATION DATE: NORMAL

## 2022-09-23 PROCEDURE — 25010000002 ENOXAPARIN PER 10 MG: Performed by: SURGERY

## 2022-09-23 PROCEDURE — 88304 TISSUE EXAM BY PATHOLOGIST: CPT | Performed by: SURGERY

## 2022-09-23 PROCEDURE — 86900 BLOOD TYPING SEROLOGIC ABO: CPT | Performed by: SURGERY

## 2022-09-23 PROCEDURE — 82962 GLUCOSE BLOOD TEST: CPT

## 2022-09-23 PROCEDURE — 25010000002 DEXAMETHASONE SODIUM PHOSPHATE 10 MG/ML SOLUTION: Performed by: NURSE ANESTHETIST, CERTIFIED REGISTERED

## 2022-09-23 PROCEDURE — C9399 UNCLASSIFIED DRUGS OR BIOLOG: HCPCS | Performed by: NURSE ANESTHETIST, CERTIFIED REGISTERED

## 2022-09-23 PROCEDURE — 47562 LAPAROSCOPIC CHOLECYSTECTOMY: CPT | Performed by: SURGERY

## 2022-09-23 PROCEDURE — 0DJ08ZZ INSPECTION OF UPPER INTESTINAL TRACT, VIA NATURAL OR ARTIFICIAL OPENING ENDOSCOPIC: ICD-10-PCS | Performed by: SURGERY

## 2022-09-23 PROCEDURE — 25010000002 AMISULPRIDE (ANTIEMETIC) 10 MG/4ML SOLUTION: Performed by: NURSE ANESTHETIST, CERTIFIED REGISTERED

## 2022-09-23 PROCEDURE — 0DB64Z3 EXCISION OF STOMACH, PERCUTANEOUS ENDOSCOPIC APPROACH, VERTICAL: ICD-10-PCS | Performed by: SURGERY

## 2022-09-23 PROCEDURE — 43775 LAP SLEEVE GASTRECTOMY: CPT | Performed by: SURGERY

## 2022-09-23 PROCEDURE — 25010000002 PROPOFOL 10 MG/ML EMULSION: Performed by: NURSE ANESTHETIST, CERTIFIED REGISTERED

## 2022-09-23 PROCEDURE — 25010000002 GLUCAGON (HUMAN RECOMBINANT) 1 MG RECONSTITUTED SOLUTION: Performed by: NURSE ANESTHETIST, CERTIFIED REGISTERED

## 2022-09-23 PROCEDURE — 94799 UNLISTED PULMONARY SVC/PX: CPT

## 2022-09-23 PROCEDURE — 86850 RBC ANTIBODY SCREEN: CPT | Performed by: SURGERY

## 2022-09-23 PROCEDURE — 86901 BLOOD TYPING SEROLOGIC RH(D): CPT | Performed by: SURGERY

## 2022-09-23 PROCEDURE — 0FT44ZZ RESECTION OF GALLBLADDER, PERCUTANEOUS ENDOSCOPIC APPROACH: ICD-10-PCS | Performed by: SURGERY

## 2022-09-23 PROCEDURE — 88307 TISSUE EXAM BY PATHOLOGIST: CPT | Performed by: SURGERY

## 2022-09-23 PROCEDURE — 86870 RBC ANTIBODY IDENTIFICATION: CPT | Performed by: SURGERY

## 2022-09-23 PROCEDURE — 25010000002 CEFAZOLIN PER 500 MG: Performed by: SURGERY

## 2022-09-23 PROCEDURE — 81025 URINE PREGNANCY TEST: CPT | Performed by: ANESTHESIOLOGY

## 2022-09-23 PROCEDURE — 25010000002 FENTANYL CITRATE (PF) 50 MCG/ML SOLUTION

## 2022-09-23 PROCEDURE — 25010000002 ONDANSETRON PER 1 MG: Performed by: NURSE ANESTHETIST, CERTIFIED REGISTERED

## 2022-09-23 DEVICE — SEALANT WND FIBRIN TISSEEL VAPOR/HEAT/PREFIL/SYR 10ML: Type: IMPLANTABLE DEVICE | Site: STOMACH | Status: FUNCTIONAL

## 2022-09-23 DEVICE — LIGAMAX 5 MM ENDOSCOPIC MULTIPLE CLIP APPLIER
Type: IMPLANTABLE DEVICE | Site: BILE DUCT | Status: FUNCTIONAL
Brand: LIGAMAX

## 2022-09-23 DEVICE — IMPLANTABLE DEVICE
Type: IMPLANTABLE DEVICE | Site: STOMACH | Status: FUNCTIONAL
Brand: TITAN SGS STANDARD GASTRIC STAPLER

## 2022-09-23 RX ORDER — ROSUVASTATIN CALCIUM 10 MG/1
10 TABLET, COATED ORAL NIGHTLY
Status: DISCONTINUED | OUTPATIENT
Start: 2022-09-23 | End: 2022-09-24 | Stop reason: HOSPADM

## 2022-09-23 RX ORDER — LEVOTHYROXINE SODIUM 0.07 MG/1
75 TABLET ORAL
Status: DISCONTINUED | OUTPATIENT
Start: 2022-09-24 | End: 2022-09-24 | Stop reason: HOSPADM

## 2022-09-23 RX ORDER — CEFAZOLIN SODIUM IN 0.9 % NACL 3 G/100 ML
3 INTRAVENOUS SOLUTION, PIGGYBACK (ML) INTRAVENOUS ONCE
Status: COMPLETED | OUTPATIENT
Start: 2022-09-23 | End: 2022-09-23

## 2022-09-23 RX ORDER — SODIUM CHLORIDE, SODIUM LACTATE, POTASSIUM CHLORIDE, CALCIUM CHLORIDE 600; 310; 30; 20 MG/100ML; MG/100ML; MG/100ML; MG/100ML
9 INJECTION, SOLUTION INTRAVENOUS CONTINUOUS
Status: DISCONTINUED | OUTPATIENT
Start: 2022-09-23 | End: 2022-09-23

## 2022-09-23 RX ORDER — DROPERIDOL 2.5 MG/ML
0.62 INJECTION, SOLUTION INTRAMUSCULAR; INTRAVENOUS
Status: DISCONTINUED | OUTPATIENT
Start: 2022-09-23 | End: 2022-09-23 | Stop reason: HOSPADM

## 2022-09-23 RX ORDER — HYDROMORPHONE HYDROCHLORIDE 1 MG/ML
0.5 INJECTION, SOLUTION INTRAMUSCULAR; INTRAVENOUS; SUBCUTANEOUS
Status: DISCONTINUED | OUTPATIENT
Start: 2022-09-23 | End: 2022-09-23 | Stop reason: HOSPADM

## 2022-09-23 RX ORDER — LIDOCAINE HYDROCHLORIDE 10 MG/ML
0.5 INJECTION, SOLUTION EPIDURAL; INFILTRATION; INTRACAUDAL; PERINEURAL ONCE AS NEEDED
Status: COMPLETED | OUTPATIENT
Start: 2022-09-23 | End: 2022-09-23

## 2022-09-23 RX ORDER — ALBUTEROL SULFATE 2.5 MG/3ML
2.5 SOLUTION RESPIRATORY (INHALATION) EVERY 4 HOURS PRN
Status: DISCONTINUED | OUTPATIENT
Start: 2022-09-23 | End: 2022-09-24 | Stop reason: HOSPADM

## 2022-09-23 RX ORDER — HYDROMORPHONE HYDROCHLORIDE 2 MG/1
2 TABLET ORAL EVERY 4 HOURS PRN
Status: DISCONTINUED | OUTPATIENT
Start: 2022-09-23 | End: 2022-09-24 | Stop reason: HOSPADM

## 2022-09-23 RX ORDER — ENOXAPARIN SODIUM 100 MG/ML
INJECTION SUBCUTANEOUS AS NEEDED
Status: DISCONTINUED | OUTPATIENT
Start: 2022-09-23 | End: 2022-09-23 | Stop reason: HOSPADM

## 2022-09-23 RX ORDER — MEPERIDINE HYDROCHLORIDE 25 MG/ML
12.5 INJECTION INTRAMUSCULAR; INTRAVENOUS; SUBCUTANEOUS
Status: DISCONTINUED | OUTPATIENT
Start: 2022-09-23 | End: 2022-09-23 | Stop reason: HOSPADM

## 2022-09-23 RX ORDER — PROMETHAZINE HYDROCHLORIDE 25 MG/1
25 SUPPOSITORY RECTAL ONCE AS NEEDED
Status: DISCONTINUED | OUTPATIENT
Start: 2022-09-23 | End: 2022-09-23 | Stop reason: HOSPADM

## 2022-09-23 RX ORDER — CEFAZOLIN SODIUM IN 0.9 % NACL 3 G/100 ML
3 INTRAVENOUS SOLUTION, PIGGYBACK (ML) INTRAVENOUS EVERY 8 HOURS
Status: COMPLETED | OUTPATIENT
Start: 2022-09-23 | End: 2022-09-24

## 2022-09-23 RX ORDER — ALPRAZOLAM 0.25 MG/1
0.25 TABLET ORAL ONCE AS NEEDED
Status: DISCONTINUED | OUTPATIENT
Start: 2022-09-23 | End: 2022-09-24 | Stop reason: HOSPADM

## 2022-09-23 RX ORDER — MAGNESIUM HYDROXIDE 1200 MG/15ML
LIQUID ORAL AS NEEDED
Status: DISCONTINUED | OUTPATIENT
Start: 2022-09-23 | End: 2022-09-23 | Stop reason: HOSPADM

## 2022-09-23 RX ORDER — GABAPENTIN 100 MG/1
100 CAPSULE ORAL 3 TIMES DAILY
Status: DISCONTINUED | OUTPATIENT
Start: 2022-09-23 | End: 2022-09-24 | Stop reason: HOSPADM

## 2022-09-23 RX ORDER — HYDRALAZINE HYDROCHLORIDE 20 MG/ML
5 INJECTION INTRAMUSCULAR; INTRAVENOUS
Status: DISCONTINUED | OUTPATIENT
Start: 2022-09-23 | End: 2022-09-23 | Stop reason: HOSPADM

## 2022-09-23 RX ORDER — HYDRALAZINE HYDROCHLORIDE 20 MG/ML
10 INJECTION INTRAMUSCULAR; INTRAVENOUS
Status: DISCONTINUED | OUTPATIENT
Start: 2022-09-23 | End: 2022-09-24 | Stop reason: HOSPADM

## 2022-09-23 RX ORDER — BUPIVACAINE HCL/0.9 % NACL/PF 0.125 %
PLASTIC BAG, INJECTION (ML) EPIDURAL AS NEEDED
Status: DISCONTINUED | OUTPATIENT
Start: 2022-09-23 | End: 2022-09-23 | Stop reason: SURG

## 2022-09-23 RX ORDER — FEXOFENADINE HCL 180 MG/1
180 TABLET ORAL DAILY
Status: DISCONTINUED | OUTPATIENT
Start: 2022-09-24 | End: 2022-09-23

## 2022-09-23 RX ORDER — SODIUM CHLORIDE 0.9 % (FLUSH) 0.9 %
3-10 SYRINGE (ML) INJECTION AS NEEDED
Status: DISCONTINUED | OUTPATIENT
Start: 2022-09-23 | End: 2022-09-23 | Stop reason: HOSPADM

## 2022-09-23 RX ORDER — LORAZEPAM 1 MG/1
1 TABLET ORAL EVERY 12 HOURS PRN
Status: DISCONTINUED | OUTPATIENT
Start: 2022-09-23 | End: 2022-09-24 | Stop reason: HOSPADM

## 2022-09-23 RX ORDER — DEXAMETHASONE SODIUM PHOSPHATE 10 MG/ML
INJECTION, SOLUTION INTRAMUSCULAR; INTRAVENOUS
Status: COMPLETED | OUTPATIENT
Start: 2022-09-23 | End: 2022-09-23

## 2022-09-23 RX ORDER — EPHEDRINE SULFATE 50 MG/ML
INJECTION INTRAVENOUS
Status: DISPENSED
Start: 2022-09-23 | End: 2022-09-23

## 2022-09-23 RX ORDER — SIMETHICONE 80 MG
TABLET,CHEWABLE ORAL
Status: COMPLETED
Start: 2022-09-23 | End: 2022-09-23

## 2022-09-23 RX ORDER — NALOXONE HCL 0.4 MG/ML
0.4 VIAL (ML) INJECTION AS NEEDED
Status: DISCONTINUED | OUTPATIENT
Start: 2022-09-23 | End: 2022-09-23 | Stop reason: HOSPADM

## 2022-09-23 RX ORDER — SODIUM CHLORIDE 9 MG/ML
INJECTION, SOLUTION INTRAVENOUS AS NEEDED
Status: DISCONTINUED | OUTPATIENT
Start: 2022-09-23 | End: 2022-09-23 | Stop reason: HOSPADM

## 2022-09-23 RX ORDER — FAMOTIDINE 20 MG/1
20 TABLET, FILM COATED ORAL ONCE
Status: DISCONTINUED | OUTPATIENT
Start: 2022-09-23 | End: 2022-09-23 | Stop reason: HOSPADM

## 2022-09-23 RX ORDER — ONDANSETRON 2 MG/ML
INJECTION INTRAMUSCULAR; INTRAVENOUS AS NEEDED
Status: DISCONTINUED | OUTPATIENT
Start: 2022-09-23 | End: 2022-09-23 | Stop reason: SURG

## 2022-09-23 RX ORDER — NALOXONE HCL 0.4 MG/ML
0.4 VIAL (ML) INJECTION
Status: DISCONTINUED | OUTPATIENT
Start: 2022-09-23 | End: 2022-09-24 | Stop reason: HOSPADM

## 2022-09-23 RX ORDER — DROPERIDOL 2.5 MG/ML
0.62 INJECTION, SOLUTION INTRAMUSCULAR; INTRAVENOUS ONCE AS NEEDED
Status: DISCONTINUED | OUTPATIENT
Start: 2022-09-23 | End: 2022-09-23 | Stop reason: HOSPADM

## 2022-09-23 RX ORDER — SODIUM CHLORIDE 0.9 % (FLUSH) 0.9 %
10 SYRINGE (ML) INJECTION EVERY 12 HOURS SCHEDULED
Status: DISCONTINUED | OUTPATIENT
Start: 2022-09-23 | End: 2022-09-23 | Stop reason: HOSPADM

## 2022-09-23 RX ORDER — ONDANSETRON 2 MG/ML
4 INJECTION INTRAMUSCULAR; INTRAVENOUS EVERY 4 HOURS PRN
Status: DISCONTINUED | OUTPATIENT
Start: 2022-09-23 | End: 2022-09-24 | Stop reason: HOSPADM

## 2022-09-23 RX ORDER — SODIUM CHLORIDE AND POTASSIUM CHLORIDE 150; 450 MG/100ML; MG/100ML
125 INJECTION, SOLUTION INTRAVENOUS CONTINUOUS
Status: DISCONTINUED | OUTPATIENT
Start: 2022-09-24 | End: 2022-09-24 | Stop reason: HOSPADM

## 2022-09-23 RX ORDER — IPRATROPIUM BROMIDE AND ALBUTEROL SULFATE 2.5; .5 MG/3ML; MG/3ML
3 SOLUTION RESPIRATORY (INHALATION) ONCE AS NEEDED
Status: DISCONTINUED | OUTPATIENT
Start: 2022-09-23 | End: 2022-09-23 | Stop reason: HOSPADM

## 2022-09-23 RX ORDER — ACETAMINOPHEN 500 MG
1000 TABLET ORAL EVERY 8 HOURS SCHEDULED
Status: DISCONTINUED | OUTPATIENT
Start: 2022-09-23 | End: 2022-09-24 | Stop reason: HOSPADM

## 2022-09-23 RX ORDER — SCOLOPAMINE TRANSDERMAL SYSTEM 1 MG/1
1 PATCH, EXTENDED RELEASE TRANSDERMAL ONCE
Status: DISCONTINUED | OUTPATIENT
Start: 2022-09-23 | End: 2022-09-23

## 2022-09-23 RX ORDER — ACETAMINOPHEN 500 MG
1000 TABLET ORAL ONCE
Status: COMPLETED | OUTPATIENT
Start: 2022-09-23 | End: 2022-09-23

## 2022-09-23 RX ORDER — SIMETHICONE 80 MG
80 TABLET,CHEWABLE ORAL 4 TIMES DAILY PRN
Status: DISCONTINUED | OUTPATIENT
Start: 2022-09-23 | End: 2022-09-24 | Stop reason: HOSPADM

## 2022-09-23 RX ORDER — PANTOPRAZOLE SODIUM 40 MG/10ML
40 INJECTION, POWDER, LYOPHILIZED, FOR SOLUTION INTRAVENOUS ONCE
Status: COMPLETED | OUTPATIENT
Start: 2022-09-23 | End: 2022-09-23

## 2022-09-23 RX ORDER — ROCURONIUM BROMIDE 10 MG/ML
INJECTION, SOLUTION INTRAVENOUS AS NEEDED
Status: DISCONTINUED | OUTPATIENT
Start: 2022-09-23 | End: 2022-09-23 | Stop reason: SURG

## 2022-09-23 RX ORDER — GABAPENTIN 300 MG/1
600 CAPSULE ORAL ONCE
Status: COMPLETED | OUTPATIENT
Start: 2022-09-23 | End: 2022-09-23

## 2022-09-23 RX ORDER — PROPOFOL 10 MG/ML
VIAL (ML) INTRAVENOUS CONTINUOUS PRN
Status: DISCONTINUED | OUTPATIENT
Start: 2022-09-23 | End: 2022-09-23 | Stop reason: SURG

## 2022-09-23 RX ORDER — FENTANYL CITRATE 50 UG/ML
INJECTION, SOLUTION INTRAMUSCULAR; INTRAVENOUS
Status: COMPLETED
Start: 2022-09-23 | End: 2022-09-23

## 2022-09-23 RX ORDER — DEXMEDETOMIDINE HYDROCHLORIDE 100 UG/ML
INJECTION, SOLUTION INTRAVENOUS AS NEEDED
Status: DISCONTINUED | OUTPATIENT
Start: 2022-09-23 | End: 2022-09-23 | Stop reason: SURG

## 2022-09-23 RX ORDER — ENOXAPARIN SODIUM 100 MG/ML
40 INJECTION SUBCUTANEOUS DAILY
Status: DISCONTINUED | OUTPATIENT
Start: 2022-09-24 | End: 2022-09-24 | Stop reason: HOSPADM

## 2022-09-23 RX ORDER — SODIUM CHLORIDE 0.9 % (FLUSH) 0.9 %
10 SYRINGE (ML) INJECTION AS NEEDED
Status: DISCONTINUED | OUTPATIENT
Start: 2022-09-23 | End: 2022-09-23 | Stop reason: HOSPADM

## 2022-09-23 RX ORDER — CYANOCOBALAMIN 1000 UG/ML
1000 INJECTION, SOLUTION INTRAMUSCULAR; SUBCUTANEOUS ONCE
Status: COMPLETED | OUTPATIENT
Start: 2022-09-24 | End: 2022-09-24

## 2022-09-23 RX ORDER — PROMETHAZINE HYDROCHLORIDE 12.5 MG/1
12.5 TABLET ORAL EVERY 6 HOURS PRN
Status: DISCONTINUED | OUTPATIENT
Start: 2022-09-23 | End: 2022-09-24 | Stop reason: HOSPADM

## 2022-09-23 RX ORDER — DILTIAZEM HYDROCHLORIDE 180 MG/1
180 CAPSULE, COATED, EXTENDED RELEASE ORAL DAILY
Status: DISCONTINUED | OUTPATIENT
Start: 2022-09-23 | End: 2022-09-24 | Stop reason: HOSPADM

## 2022-09-23 RX ORDER — HYDROCODONE BITARTRATE AND ACETAMINOPHEN 5; 325 MG/1; MG/1
1 TABLET ORAL ONCE AS NEEDED
Status: DISCONTINUED | OUTPATIENT
Start: 2022-09-23 | End: 2022-09-23 | Stop reason: HOSPADM

## 2022-09-23 RX ORDER — LIDOCAINE HYDROCHLORIDE 10 MG/ML
INJECTION, SOLUTION EPIDURAL; INFILTRATION; INTRACAUDAL; PERINEURAL AS NEEDED
Status: DISCONTINUED | OUTPATIENT
Start: 2022-09-23 | End: 2022-09-23 | Stop reason: SURG

## 2022-09-23 RX ORDER — LORAZEPAM 2 MG/ML
0.5 INJECTION INTRAMUSCULAR EVERY 12 HOURS PRN
Status: DISCONTINUED | OUTPATIENT
Start: 2022-09-23 | End: 2022-09-24 | Stop reason: HOSPADM

## 2022-09-23 RX ORDER — OXYCODONE HYDROCHLORIDE 5 MG/1
5 TABLET ORAL EVERY 6 HOURS PRN
Status: DISCONTINUED | OUTPATIENT
Start: 2022-09-23 | End: 2022-09-24 | Stop reason: HOSPADM

## 2022-09-23 RX ORDER — DIPHENHYDRAMINE HYDROCHLORIDE 50 MG/ML
25 INJECTION INTRAMUSCULAR; INTRAVENOUS EVERY 4 HOURS PRN
Status: DISCONTINUED | OUTPATIENT
Start: 2022-09-23 | End: 2022-09-24 | Stop reason: HOSPADM

## 2022-09-23 RX ORDER — SODIUM CHLORIDE 0.9 % (FLUSH) 0.9 %
3 SYRINGE (ML) INJECTION EVERY 12 HOURS SCHEDULED
Status: DISCONTINUED | OUTPATIENT
Start: 2022-09-23 | End: 2022-09-23 | Stop reason: HOSPADM

## 2022-09-23 RX ORDER — PROCHLORPERAZINE MALEATE 10 MG
10 TABLET ORAL EVERY 6 HOURS PRN
Status: DISCONTINUED | OUTPATIENT
Start: 2022-09-23 | End: 2022-09-24 | Stop reason: HOSPADM

## 2022-09-23 RX ORDER — ONDANSETRON 4 MG/1
4 TABLET, FILM COATED ORAL EVERY 6 HOURS PRN
Status: DISCONTINUED | OUTPATIENT
Start: 2022-09-27 | End: 2022-09-24 | Stop reason: HOSPADM

## 2022-09-23 RX ORDER — GABAPENTIN 250 MG/5ML
100 SOLUTION ORAL 3 TIMES DAILY
Status: DISCONTINUED | OUTPATIENT
Start: 2022-09-23 | End: 2022-09-24 | Stop reason: HOSPADM

## 2022-09-23 RX ORDER — FIBRINOGEN HUMAN AND THROMBIN HUMAN 10 ML
KIT TOPICAL AS NEEDED
Status: DISCONTINUED | OUTPATIENT
Start: 2022-09-23 | End: 2022-09-23 | Stop reason: HOSPADM

## 2022-09-23 RX ORDER — BUPIVACAINE HYDROCHLORIDE AND EPINEPHRINE 5; 5 MG/ML; UG/ML
INJECTION, SOLUTION PERINEURAL AS NEEDED
Status: DISCONTINUED | OUTPATIENT
Start: 2022-09-23 | End: 2022-09-23 | Stop reason: HOSPADM

## 2022-09-23 RX ORDER — ONDANSETRON 4 MG/1
4 TABLET, FILM COATED ORAL EVERY 4 HOURS PRN
Status: DISCONTINUED | OUTPATIENT
Start: 2022-09-23 | End: 2022-09-23 | Stop reason: SDUPTHER

## 2022-09-23 RX ORDER — CLONIDINE HYDROCHLORIDE 0.1 MG/1
0.1 TABLET ORAL AS NEEDED
Status: DISCONTINUED | OUTPATIENT
Start: 2022-09-23 | End: 2022-09-24 | Stop reason: HOSPADM

## 2022-09-23 RX ORDER — PROMETHAZINE HYDROCHLORIDE 25 MG/1
25 TABLET ORAL ONCE AS NEEDED
Status: DISCONTINUED | OUTPATIENT
Start: 2022-09-23 | End: 2022-09-23 | Stop reason: HOSPADM

## 2022-09-23 RX ORDER — SODIUM CHLORIDE 9 MG/ML
150 INJECTION, SOLUTION INTRAVENOUS CONTINUOUS
Status: DISCONTINUED | OUTPATIENT
Start: 2022-09-23 | End: 2022-09-23 | Stop reason: SDUPTHER

## 2022-09-23 RX ORDER — BUPIVACAINE HYDROCHLORIDE 2.5 MG/ML
INJECTION, SOLUTION EPIDURAL; INFILTRATION; INTRACAUDAL
Status: COMPLETED | OUTPATIENT
Start: 2022-09-23 | End: 2022-09-23

## 2022-09-23 RX ORDER — LABETALOL HYDROCHLORIDE 5 MG/ML
5 INJECTION, SOLUTION INTRAVENOUS
Status: DISCONTINUED | OUTPATIENT
Start: 2022-09-23 | End: 2022-09-23 | Stop reason: HOSPADM

## 2022-09-23 RX ORDER — ENOXAPARIN SODIUM 100 MG/ML
40 INJECTION SUBCUTANEOUS ONCE
Status: DISCONTINUED | OUTPATIENT
Start: 2022-09-23 | End: 2022-09-23 | Stop reason: HOSPADM

## 2022-09-23 RX ORDER — MIDAZOLAM HYDROCHLORIDE 1 MG/ML
1 INJECTION INTRAMUSCULAR; INTRAVENOUS
Status: DISCONTINUED | OUTPATIENT
Start: 2022-09-23 | End: 2022-09-23 | Stop reason: HOSPADM

## 2022-09-23 RX ORDER — FENTANYL CITRATE 50 UG/ML
50 INJECTION, SOLUTION INTRAMUSCULAR; INTRAVENOUS
Status: DISCONTINUED | OUTPATIENT
Start: 2022-09-23 | End: 2022-09-23 | Stop reason: HOSPADM

## 2022-09-23 RX ORDER — INSULIN LISPRO 100 [IU]/ML
0-9 INJECTION, SOLUTION INTRAVENOUS; SUBCUTANEOUS
Status: DISCONTINUED | OUTPATIENT
Start: 2022-09-23 | End: 2022-09-24 | Stop reason: HOSPADM

## 2022-09-23 RX ORDER — NALOXONE HCL 0.4 MG/ML
0.1 VIAL (ML) INJECTION
Status: DISCONTINUED | OUTPATIENT
Start: 2022-09-23 | End: 2022-09-24 | Stop reason: HOSPADM

## 2022-09-23 RX ORDER — LOSARTAN POTASSIUM 50 MG/1
50 TABLET ORAL 2 TIMES DAILY
Status: DISCONTINUED | OUTPATIENT
Start: 2022-09-23 | End: 2022-09-24 | Stop reason: HOSPADM

## 2022-09-23 RX ORDER — MORPHINE SULFATE 4 MG/ML
4 INJECTION, SOLUTION INTRAMUSCULAR; INTRAVENOUS
Status: DISCONTINUED | OUTPATIENT
Start: 2022-09-23 | End: 2022-09-24 | Stop reason: HOSPADM

## 2022-09-23 RX ORDER — ONDANSETRON 2 MG/ML
4 INJECTION INTRAMUSCULAR; INTRAVENOUS ONCE AS NEEDED
Status: DISCONTINUED | OUTPATIENT
Start: 2022-09-23 | End: 2022-09-23 | Stop reason: HOSPADM

## 2022-09-23 RX ORDER — LEVOTHYROXINE SODIUM 0.07 MG/1
75 TABLET ORAL DAILY
Status: DISCONTINUED | OUTPATIENT
Start: 2022-09-23 | End: 2022-09-23

## 2022-09-23 RX ORDER — PANTOPRAZOLE SODIUM 40 MG/10ML
40 INJECTION, POWDER, LYOPHILIZED, FOR SOLUTION INTRAVENOUS
Status: DISCONTINUED | OUTPATIENT
Start: 2022-09-24 | End: 2022-09-24 | Stop reason: HOSPADM

## 2022-09-23 RX ORDER — SODIUM CHLORIDE 9 MG/ML
150 INJECTION, SOLUTION INTRAVENOUS CONTINUOUS
Status: DISCONTINUED | OUTPATIENT
Start: 2022-09-23 | End: 2022-09-24 | Stop reason: HOSPADM

## 2022-09-23 RX ORDER — CHLORHEXIDINE GLUCONATE 0.12 MG/ML
30 RINSE ORAL
Status: COMPLETED | OUTPATIENT
Start: 2022-09-23 | End: 2022-09-23

## 2022-09-23 RX ORDER — METOCLOPRAMIDE HYDROCHLORIDE 5 MG/ML
10 INJECTION INTRAMUSCULAR; INTRAVENOUS EVERY 6 HOURS PRN
Status: DISCONTINUED | OUTPATIENT
Start: 2022-09-23 | End: 2022-09-24 | Stop reason: HOSPADM

## 2022-09-23 RX ORDER — ACETAMINOPHEN 160 MG/5ML
1000 SOLUTION ORAL EVERY 8 HOURS SCHEDULED
Status: DISCONTINUED | OUTPATIENT
Start: 2022-09-23 | End: 2022-09-24 | Stop reason: HOSPADM

## 2022-09-23 RX ORDER — BUSPIRONE HYDROCHLORIDE 15 MG/1
7.5 TABLET ORAL 2 TIMES DAILY
Status: DISCONTINUED | OUTPATIENT
Start: 2022-09-23 | End: 2022-09-24 | Stop reason: HOSPADM

## 2022-09-23 RX ADMIN — SODIUM CHLORIDE, POTASSIUM CHLORIDE, SODIUM LACTATE AND CALCIUM CHLORIDE: 600; 310; 30; 20 INJECTION, SOLUTION INTRAVENOUS at 09:49

## 2022-09-23 RX ADMIN — PROPOFOL 250 MG: 10 INJECTION, EMULSION INTRAVENOUS at 09:55

## 2022-09-23 RX ADMIN — CHLORHEXIDINE GLUCONATE 0.12% ORAL RINSE 30 ML: 1.2 LIQUID ORAL at 09:17

## 2022-09-23 RX ADMIN — DEXAMETHASONE SODIUM PHOSPHATE 6 MG: 10 INJECTION, SOLUTION INTRAMUSCULAR; INTRAVENOUS at 10:09

## 2022-09-23 RX ADMIN — FENTANYL CITRATE 50 MCG: 50 INJECTION, SOLUTION INTRAMUSCULAR; INTRAVENOUS at 12:06

## 2022-09-23 RX ADMIN — DEXMEDETOMIDINE HYDROCHLORIDE 8 MCG: 100 INJECTION, SOLUTION INTRAVENOUS at 11:18

## 2022-09-23 RX ADMIN — Medication 100 MCG: at 11:00

## 2022-09-23 RX ADMIN — BUPIVACAINE HYDROCHLORIDE 60 ML: 2.5 INJECTION, SOLUTION EPIDURAL; INFILTRATION; INTRACAUDAL; PERINEURAL at 09:59

## 2022-09-23 RX ADMIN — OXYCODONE 5 MG: 5 TABLET ORAL at 21:28

## 2022-09-23 RX ADMIN — PROPOFOL 25 MCG/KG/MIN: 10 INJECTION, EMULSION INTRAVENOUS at 10:07

## 2022-09-23 RX ADMIN — Medication 100 MCG: at 10:53

## 2022-09-23 RX ADMIN — SUGAMMADEX 200 MG: 100 INJECTION, SOLUTION INTRAVENOUS at 11:21

## 2022-09-23 RX ADMIN — ACETAMINOPHEN 1000 MG: 500 TABLET, FILM COATED ORAL at 15:09

## 2022-09-23 RX ADMIN — DEXMEDETOMIDINE HYDROCHLORIDE 8 MCG: 100 INJECTION, SOLUTION INTRAVENOUS at 11:20

## 2022-09-23 RX ADMIN — AMISULPRIDE 10 MG: 2.5 INJECTION, SOLUTION INTRAVENOUS at 10:13

## 2022-09-23 RX ADMIN — DEXAMETHASONE SODIUM PHOSPHATE 4 MG: 10 INJECTION, SOLUTION INTRAMUSCULAR; INTRAVENOUS at 09:59

## 2022-09-23 RX ADMIN — ONDANSETRON 4 MG: 2 INJECTION INTRAMUSCULAR; INTRAVENOUS at 11:14

## 2022-09-23 RX ADMIN — GABAPENTIN 600 MG: 300 CAPSULE ORAL at 09:18

## 2022-09-23 RX ADMIN — DEXMEDETOMIDINE HYDROCHLORIDE 8 MCG: 100 INJECTION, SOLUTION INTRAVENOUS at 11:12

## 2022-09-23 RX ADMIN — LIDOCAINE HYDROCHLORIDE 0.5 ML: 10 INJECTION, SOLUTION EPIDURAL; INFILTRATION; INTRACAUDAL; PERINEURAL at 09:23

## 2022-09-23 RX ADMIN — SODIUM CHLORIDE, POTASSIUM CHLORIDE, SODIUM LACTATE AND CALCIUM CHLORIDE: 600; 310; 30; 20 INJECTION, SOLUTION INTRAVENOUS at 11:27

## 2022-09-23 RX ADMIN — SODIUM CHLORIDE, POTASSIUM CHLORIDE, SODIUM LACTATE AND CALCIUM CHLORIDE: 600; 310; 30; 20 INJECTION, SOLUTION INTRAVENOUS at 10:58

## 2022-09-23 RX ADMIN — ACETAMINOPHEN 1000 MG: 500 TABLET, FILM COATED ORAL at 09:18

## 2022-09-23 RX ADMIN — ROCURONIUM BROMIDE 100 MG: 50 INJECTION, SOLUTION INTRAVENOUS at 09:55

## 2022-09-23 RX ADMIN — Medication 100 MCG: at 11:11

## 2022-09-23 RX ADMIN — GABAPENTIN 100 MG: 100 CAPSULE ORAL at 21:13

## 2022-09-23 RX ADMIN — ACETAMINOPHEN 1000 MG: 500 TABLET, FILM COATED ORAL at 21:13

## 2022-09-23 RX ADMIN — LIDOCAINE HYDROCHLORIDE 100 MG: 10 INJECTION, SOLUTION EPIDURAL; INFILTRATION; INTRACAUDAL; PERINEURAL at 09:55

## 2022-09-23 RX ADMIN — SODIUM CHLORIDE 150 ML/HR: 9 INJECTION, SOLUTION INTRAVENOUS at 19:59

## 2022-09-23 RX ADMIN — DILTIAZEM HYDROCHLORIDE 180 MG: 180 CAPSULE, COATED, EXTENDED RELEASE ORAL at 15:09

## 2022-09-23 RX ADMIN — GABAPENTIN 100 MG: 100 CAPSULE ORAL at 15:09

## 2022-09-23 RX ADMIN — OXYCODONE 5 MG: 5 TABLET ORAL at 15:25

## 2022-09-23 RX ADMIN — SODIUM CHLORIDE 150 ML/HR: 9 INJECTION, SOLUTION INTRAVENOUS at 13:02

## 2022-09-23 RX ADMIN — LOSARTAN POTASSIUM 50 MG: 50 TABLET, FILM COATED ORAL at 21:13

## 2022-09-23 RX ADMIN — CHLORHEXIDINE GLUCONATE 0.12% ORAL RINSE 30 ML: 1.2 LIQUID ORAL at 09:23

## 2022-09-23 RX ADMIN — Medication 100 MCG: at 10:47

## 2022-09-23 RX ADMIN — ROSUVASTATIN CALCIUM 10 MG: 10 TABLET, COATED ORAL at 21:13

## 2022-09-23 RX ADMIN — PANTOPRAZOLE SODIUM 40 MG: 40 INJECTION, POWDER, FOR SOLUTION INTRAVENOUS at 09:17

## 2022-09-23 RX ADMIN — GLUCAGON HYDROCHLORIDE 1 MG: KIT at 10:30

## 2022-09-23 RX ADMIN — SIMETHICONE 80 MG: 80 TABLET, CHEWABLE ORAL at 12:20

## 2022-09-23 RX ADMIN — BUSPIRONE HYDROCHLORIDE 7.5 MG: 15 TABLET ORAL at 21:14

## 2022-09-23 RX ADMIN — DEXMEDETOMIDINE HYDROCHLORIDE 8 MCG: 100 INJECTION, SOLUTION INTRAVENOUS at 11:14

## 2022-09-23 RX ADMIN — SODIUM CHLORIDE 1000 ML: 9 INJECTION, SOLUTION INTRAVENOUS at 09:23

## 2022-09-23 RX ADMIN — Medication 100 MCG: at 11:06

## 2022-09-23 RX ADMIN — SCOPALAMINE 1 PATCH: 1 PATCH, EXTENDED RELEASE TRANSDERMAL at 09:17

## 2022-09-23 RX ADMIN — Medication 3 G: at 09:53

## 2022-09-23 RX ADMIN — CEFAZOLIN 3 G: 10 INJECTION, POWDER, FOR SOLUTION INTRAVENOUS at 15:09

## 2022-09-23 RX ADMIN — DEXMEDETOMIDINE HYDROCHLORIDE 8 MCG: 100 INJECTION, SOLUTION INTRAVENOUS at 11:16

## 2022-09-23 NOTE — ANESTHESIA PROCEDURE NOTES
Airway  Urgency: elective    Date/Time: 9/23/2022 9:56 AM  Airway not difficult    General Information and Staff    Patient location during procedure: OR  CRNA/CAA: Neel Pedersen III, CRNA    Indications and Patient Condition  Indications for airway management: airway protection    Preoxygenated: yes  MILS not maintained throughout  Mask difficulty assessment: 0 - not attempted    Final Airway Details  Final airway type: endotracheal airway      Successful airway: ETT  Cuffed: yes   Successful intubation technique: direct laryngoscopy  Blade: Rogerio  Blade size: 3  ETT size (mm): 7.0  Cormack-Lehane Classification: grade IIa - partial view of glottis  Placement verified by: chest auscultation and capnometry   Measured from: lips  ETT/EBT  to lips (cm): 21  Number of attempts at approach: 1  Assessment: lips, teeth, and gum same as pre-op and atraumatic intubation    Additional Comments  Negative epigastric sounds, Breath sound equal bilaterally with symmetric chest rise and fall.

## 2022-09-23 NOTE — ANESTHESIA PROCEDURE NOTES
Peripheral Block      Patient reassessed immediately prior to procedure    Patient location during procedure: OR  Reason for block: at surgeon's request and post-op pain management  Performed by  CRNA/CAA: Neel Pedersen III, CRNA  Preanesthetic Checklist  Completed: patient identified, IV checked, site marked, risks and benefits discussed, surgical consent, monitors and equipment checked, pre-op evaluation and timeout performed  Prep:  Pt Position: supine  Sterile barriers:cap, gloves, mask and washed/disinfected hands  Prep: ChloraPrep  Patient monitoring: blood pressure monitoring, continuous pulse oximetry and EKG  Procedure    Sedation: yes  Performed under: general  Guidance:ultrasound guided  Images:still images obtained, printed/placed on chart    Laterality:Bilateral  Block Type:TAP  Injection Technique:single-shot  Needle Type:short-bevel and echogenic  Needle Gauge:20 G  Resistance on Injection: none    Medications Used: dexamethasone sodium phosphate injection, 4 mg  bupivacaine PF (MARCAINE) 0.25 % injection, 60 mL  Med administered at 9/23/2022 9:59 AM      Medications  Comment:Block Injection:  LA dose divided between Right and Left block        Post Assessment  Injection Assessment: negative aspiration for heme, incremental injection and no paresthesia on injection  Patient Tolerance:comfortable throughout block  Complications:no  Additional Notes      Under Ultrasound guidance, a BBraun 4inch 360 degree needle was advanced with Normal Saline hydro dissection of tissue.  The Internal Oblique and Transversus Abdominus muscles where visualized.  At or before the aponeurosis of Internal Oblique, local anesthetic spread was visualized in the Transversus Abdominus Plane. Injection was made incrementally with aspiration every 5 mls.  There was no  intravascular injection,  injection pressure was normal, there was no neural injection, and the procedure was completed without difficulty.  Thank  You.

## 2022-09-23 NOTE — ANESTHESIA PREPROCEDURE EVALUATION
Anesthesia Evaluation     Patient summary reviewed and Nursing notes reviewed                Airway   Mallampati: III  TM distance: >3 FB  Neck ROM: full  No difficulty expected  Dental - normal exam     Pulmonary - negative pulmonary ROS and normal exam   Cardiovascular - normal exam    (+) hypertension, hyperlipidemia,       Neuro/Psych- negative ROS  GI/Hepatic/Renal/Endo    (+) morbid obesity, GERD,  diabetes mellitus, thyroid problem hypothyroidism    Musculoskeletal (-) negative ROS    Abdominal  - normal exam    Bowel sounds: normal.   Substance History - negative use     OB/GYN negative ob/gyn ROS         Other                      Anesthesia Plan    ASA 3     general     (TAP blocks)  intravenous induction     Anesthetic plan, risks, benefits, and alternatives have been provided, discussed and informed consent has been obtained with: patient.    Plan discussed with CRNA.        CODE STATUS:

## 2022-09-23 NOTE — ANESTHESIA POSTPROCEDURE EVALUATION
Patient: Emily Germain    Procedure Summary     Date: 09/23/22 Room / Location:  [facility]    Anesthesia Start: 0949 Anesthesia Stop: 1137    Procedures:       GASTRIC SLEEVE LAPAROSCOPIC (N/A Abdomen)      ESOPHAGOGASTRODUODENOSCOPY (N/A Esophagus)      CHOLECYSTECTOMY LAPAROSCOPIC (N/A Abdomen) Diagnosis:       Morbid obesity with body mass index of 45.0-49.9 in adult (HCC)      Cholelithiasis without cholecystitis      (Morbid obesity with body mass index of 45.0-49.9 in adult (HCC) [E66.01, Z68.42])      (Cholelithiasis without cholecystitis [K80.20])    Surgeons: Jluis Del Valle MD Provider: Cliff Bonds MD    Anesthesia Type: general ASA Status: 3          Anesthesia Type: general    Vitals  Vitals Value Taken Time   BP 96/40 09/23/22 1138   Temp 97.5 °F (36.4 °C) 09/23/22 1132   Pulse 64 09/23/22 1140   Resp 16 09/23/22 1132   SpO2 93 % 09/23/22 1140   Vitals shown include unvalidated device data.        Post Anesthesia Care and Evaluation    Patient location during evaluation: PACU  Patient participation: complete - patient participated  Level of consciousness: awake and alert  Pain management: adequate    Airway patency: patent  Anesthetic complications: No anesthetic complications  PONV Status: none  Cardiovascular status: hemodynamically stable and acceptable  Respiratory status: nonlabored ventilation, acceptable and nasal cannula  Hydration status: acceptable

## 2022-09-24 ENCOUNTER — APPOINTMENT (OUTPATIENT)
Dept: GENERAL RADIOLOGY | Facility: HOSPITAL | Age: 52
End: 2022-09-24

## 2022-09-24 VITALS
OXYGEN SATURATION: 98 % | WEIGHT: 267 LBS | TEMPERATURE: 98.3 F | RESPIRATION RATE: 16 BRPM | HEIGHT: 63 IN | BODY MASS INDEX: 47.31 KG/M2 | HEART RATE: 69 BPM | DIASTOLIC BLOOD PRESSURE: 83 MMHG | SYSTOLIC BLOOD PRESSURE: 166 MMHG

## 2022-09-24 LAB
ALBUMIN SERPL-MCNC: 3.7 G/DL (ref 3.5–5.2)
ALBUMIN/GLOB SERPL: 1.2 G/DL
ALP SERPL-CCNC: 84 U/L (ref 39–117)
ALT SERPL W P-5'-P-CCNC: 42 U/L (ref 1–33)
ANION GAP SERPL CALCULATED.3IONS-SCNC: 9 MMOL/L (ref 5–15)
AST SERPL-CCNC: 36 U/L (ref 1–32)
BASOPHILS # BLD AUTO: 0.03 10*3/MM3 (ref 0–0.2)
BASOPHILS NFR BLD AUTO: 0.1 % (ref 0–1.5)
BILIRUB SERPL-MCNC: 0.2 MG/DL (ref 0–1.2)
BUN SERPL-MCNC: 10 MG/DL (ref 6–20)
BUN/CREAT SERPL: 15.9 (ref 7–25)
CALCIUM SPEC-SCNC: 8.6 MG/DL (ref 8.6–10.5)
CHLORIDE SERPL-SCNC: 109 MMOL/L (ref 98–107)
CO2 SERPL-SCNC: 21 MMOL/L (ref 22–29)
COLD SCREEN: POSITIVE
CREAT SERPL-MCNC: 0.63 MG/DL (ref 0.57–1)
DEPRECATED RDW RBC AUTO: 49.4 FL (ref 37–54)
EGFRCR SERPLBLD CKD-EPI 2021: 106.2 ML/MIN/1.73
EOSINOPHIL # BLD AUTO: 0.01 10*3/MM3 (ref 0–0.4)
EOSINOPHIL NFR BLD AUTO: 0 % (ref 0.3–6.2)
ERYTHROCYTE [DISTWIDTH] IN BLOOD BY AUTOMATED COUNT: 15.6 % (ref 12.3–15.4)
GLOBULIN UR ELPH-MCNC: 3.1 GM/DL
GLUCOSE BLDC GLUCOMTR-MCNC: 133 MG/DL (ref 70–130)
GLUCOSE BLDC GLUCOMTR-MCNC: 156 MG/DL (ref 70–130)
GLUCOSE SERPL-MCNC: 139 MG/DL (ref 65–99)
HCT VFR BLD AUTO: 39.5 % (ref 34–46.6)
HGB BLD-MCNC: 12.8 G/DL (ref 12–15.9)
IMM GRANULOCYTES # BLD AUTO: 0.13 10*3/MM3 (ref 0–0.05)
IMM GRANULOCYTES NFR BLD AUTO: 0.6 % (ref 0–0.5)
IRON 24H UR-MRATE: 29 MCG/DL (ref 37–145)
LYMPHOCYTES # BLD AUTO: 3.16 10*3/MM3 (ref 0.7–3.1)
LYMPHOCYTES NFR BLD AUTO: 15.1 % (ref 19.6–45.3)
MCH RBC QN AUTO: 28.3 PG (ref 26.6–33)
MCHC RBC AUTO-ENTMCNC: 32.4 G/DL (ref 31.5–35.7)
MCV RBC AUTO: 87.4 FL (ref 79–97)
MONOCYTES # BLD AUTO: 1.99 10*3/MM3 (ref 0.1–0.9)
MONOCYTES NFR BLD AUTO: 9.5 % (ref 5–12)
NEUTROPHILS NFR BLD AUTO: 15.59 10*3/MM3 (ref 1.7–7)
NEUTROPHILS NFR BLD AUTO: 74.7 % (ref 42.7–76)
NRBC BLD AUTO-RTO: 0 /100 WBC (ref 0–0.2)
PLATELET # BLD AUTO: 349 10*3/MM3 (ref 140–450)
PMV BLD AUTO: 9.9 FL (ref 6–12)
POTASSIUM SERPL-SCNC: 4 MMOL/L (ref 3.5–5.2)
PROT SERPL-MCNC: 6.8 G/DL (ref 6–8.5)
RBC # BLD AUTO: 4.52 10*6/MM3 (ref 3.77–5.28)
SODIUM SERPL-SCNC: 139 MMOL/L (ref 136–145)
WBC NRBC COR # BLD: 20.91 10*3/MM3 (ref 3.4–10.8)

## 2022-09-24 PROCEDURE — 83540 ASSAY OF IRON: CPT | Performed by: SURGERY

## 2022-09-24 PROCEDURE — 25010000002 CEFAZOLIN PER 500 MG: Performed by: SURGERY

## 2022-09-24 PROCEDURE — 0 POTASSIUM CHLORIDE PER 2 MEQ: Performed by: SURGERY

## 2022-09-24 PROCEDURE — 82962 GLUCOSE BLOOD TEST: CPT

## 2022-09-24 PROCEDURE — 63710000001 PROMETHAZINE PER 12.5 MG: Performed by: SURGERY

## 2022-09-24 PROCEDURE — 80053 COMPREHEN METABOLIC PANEL: CPT | Performed by: SURGERY

## 2022-09-24 PROCEDURE — 25010000002 ENOXAPARIN PER 10 MG: Performed by: SURGERY

## 2022-09-24 PROCEDURE — 25010000002 CYANOCOBALAMIN PER 1000 MCG: Performed by: SURGERY

## 2022-09-24 PROCEDURE — 74240 X-RAY XM UPR GI TRC 1CNTRST: CPT

## 2022-09-24 PROCEDURE — 99024 POSTOP FOLLOW-UP VISIT: CPT | Performed by: SURGERY

## 2022-09-24 PROCEDURE — 25010000002 THIAMINE PER 100 MG: Performed by: SURGERY

## 2022-09-24 PROCEDURE — 85025 COMPLETE CBC W/AUTO DIFF WBC: CPT | Performed by: SURGERY

## 2022-09-24 PROCEDURE — 25010000002 ONDANSETRON PER 1 MG: Performed by: SURGERY

## 2022-09-24 PROCEDURE — 63710000001 INSULIN LISPRO (HUMAN) PER 5 UNITS: Performed by: SURGERY

## 2022-09-24 RX ORDER — OXYCODONE HYDROCHLORIDE 5 MG/1
5 TABLET ORAL EVERY 6 HOURS PRN
Qty: 10 TABLET | Refills: 0 | Status: SHIPPED | OUTPATIENT
Start: 2022-09-24 | End: 2023-01-23

## 2022-09-24 RX ORDER — ENOXAPARIN SODIUM 100 MG/ML
40 INJECTION SUBCUTANEOUS
Qty: 8.4 ML | Refills: 0
Start: 2022-09-24 | End: 2022-10-15

## 2022-09-24 RX ORDER — ONDANSETRON 4 MG/1
4 TABLET, ORALLY DISINTEGRATING ORAL EVERY 8 HOURS PRN
Qty: 30 TABLET | Refills: 0 | Status: SHIPPED | OUTPATIENT
Start: 2022-09-24 | End: 2023-01-23

## 2022-09-24 RX ORDER — PROMETHAZINE HYDROCHLORIDE 12.5 MG/1
12.5 TABLET ORAL EVERY 6 HOURS PRN
Qty: 10 TABLET | Refills: 0 | Status: SHIPPED | OUTPATIENT
Start: 2022-09-24 | End: 2023-01-23

## 2022-09-24 RX ADMIN — CYANOCOBALAMIN 1000 MCG: 1000 INJECTION, SOLUTION INTRAMUSCULAR; SUBCUTANEOUS at 08:37

## 2022-09-24 RX ADMIN — POTASSIUM CHLORIDE AND SODIUM CHLORIDE 125 ML/HR: 450; 150 INJECTION, SOLUTION INTRAVENOUS at 10:15

## 2022-09-24 RX ADMIN — PROMETHAZINE HYDROCHLORIDE 12.5 MG: 12.5 TABLET ORAL at 13:32

## 2022-09-24 RX ADMIN — DILTIAZEM HYDROCHLORIDE 180 MG: 180 CAPSULE, COATED, EXTENDED RELEASE ORAL at 08:38

## 2022-09-24 RX ADMIN — LEVOTHYROXINE SODIUM 75 MCG: 0.07 TABLET ORAL at 05:41

## 2022-09-24 RX ADMIN — LOSARTAN POTASSIUM 50 MG: 50 TABLET, FILM COATED ORAL at 08:38

## 2022-09-24 RX ADMIN — INSULIN LISPRO 2 UNITS: 100 INJECTION, SOLUTION INTRAVENOUS; SUBCUTANEOUS at 13:03

## 2022-09-24 RX ADMIN — CEFAZOLIN 3 G: 10 INJECTION, POWDER, FOR SOLUTION INTRAVENOUS at 00:39

## 2022-09-24 RX ADMIN — THIAMINE HYDROCHLORIDE 100 ML/HR: 100 INJECTION, SOLUTION INTRAMUSCULAR; INTRAVENOUS at 03:11

## 2022-09-24 RX ADMIN — ONDANSETRON 4 MG: 2 INJECTION INTRAMUSCULAR; INTRAVENOUS at 10:15

## 2022-09-24 RX ADMIN — BUSPIRONE HYDROCHLORIDE 7.5 MG: 15 TABLET ORAL at 10:36

## 2022-09-24 RX ADMIN — PANTOPRAZOLE SODIUM 40 MG: 40 INJECTION, POWDER, FOR SOLUTION INTRAVENOUS at 05:41

## 2022-09-24 RX ADMIN — SIMETHICONE 80 MG: 80 TABLET, CHEWABLE ORAL at 00:46

## 2022-09-24 RX ADMIN — ACETAMINOPHEN 1000 MG: 500 TABLET, FILM COATED ORAL at 05:41

## 2022-09-24 RX ADMIN — ENOXAPARIN SODIUM 40 MG: 40 INJECTION SUBCUTANEOUS at 08:38

## 2022-09-24 RX ADMIN — HYDROMORPHONE HYDROCHLORIDE 2 MG: 2 TABLET ORAL at 00:46

## 2022-09-24 RX ADMIN — GABAPENTIN 100 MG: 100 CAPSULE ORAL at 08:38

## 2022-09-29 ENCOUNTER — OFFICE VISIT (OUTPATIENT)
Dept: BARIATRICS/WEIGHT MGMT | Facility: CLINIC | Age: 52
End: 2022-09-29

## 2022-09-29 VITALS
SYSTOLIC BLOOD PRESSURE: 130 MMHG | RESPIRATION RATE: 16 BRPM | OXYGEN SATURATION: 97 % | BODY MASS INDEX: 44.65 KG/M2 | TEMPERATURE: 97.8 F | WEIGHT: 252 LBS | DIASTOLIC BLOOD PRESSURE: 70 MMHG | HEART RATE: 91 BPM | HEIGHT: 63 IN

## 2022-09-29 DIAGNOSIS — E66.01 OBESITY, CLASS III, BMI 40-49.9 (MORBID OBESITY): ICD-10-CM

## 2022-09-29 DIAGNOSIS — Z98.84 STATUS POST BARIATRIC SURGERY: Primary | ICD-10-CM

## 2022-09-29 PROCEDURE — 99024 POSTOP FOLLOW-UP VISIT: CPT | Performed by: PHYSICIAN ASSISTANT

## 2022-09-29 NOTE — PROGRESS NOTES
[facility] Bariatric Surgery  [address]  [address]  [facility]ly [facility] [id number] [phone]      Patient Name:  Emily Germain.  :  1970      Reason for Visit:  POD #6    HPI:  Emily Germain is a 52 y.o. female s/p LSG/ jayson by  on 22    D/c POD#1- IV iron given, Rx oxy, zofran, phenergan, start lovenox. Hold HCTZ, mobic. Home nexium.     Doing well.  No issues/concerns. Not using antiemetics or pain meds. Feeling good.  Denies dysphagia, reflux, nausea, vomiting, abdominal pain, pulmonary issues and fevers.  Tolerating diet progression - on stage 1.  Getting 90-100g prot/day.  3 shake a day. Then soups.  Drinking 64+ fluid oz/day.hasn't started vitamins yet.   On Nexium and Lovenox .  Holding ASA , NSAIDs , Tramadol, Hormones, Diuretics , Steroids and Immunologics and tobacco use/ exposure.  Ambulating.     Presurgery weight: 267 pounds.  Today's weight is 114 kg (252 lb) pounds, today's  Body mass index is 44.65 kg/m²., and@ weight loss since surgery is 15 pounds.       Past Medical History:   Diagnosis Date   • Anxiety and depression    • Cholelithiasis     dx 18+yrs ago, relatively asx   • Diabetes (HCC)     newly dx, no insulin, A1C >7   • Dyspepsia    • Dyspnea on exertion    • Edema     HCTZ 12.5mg daily   • Fatigue    • Gallstone    • GERD (gastroesophageal reflux disease)     on Nexium x 5+yrs, improves w/ wt.loss, remote EGD (20+yrs ago) unremarkable   • HTN (hypertension)    • Hyperlipidemia    • Hypothyroidism    • Knee pain     on Mobic, prn steroid injections (next due 3/2022)   • Morbid obesity (HCC)    • PCOS (polycystic ovarian syndrome)     does not tolerate Metformin   • Vitamin D deficiency    • Wears contact lenses    • Wears reading eyeglasses      Past Surgical History:   Procedure Laterality Date   • CHOLECYSTECTOMY N/A 2022    Procedure: CHOLECYSTECTOMY LAPAROSCOPIC;  Surgeon: Jluis Del Valle MD;  Location: [facility] OR;   Service: Bariatric;  Laterality: N/A;  [id number]   • DILATATION AND CURETTAGE  1987    emergent, miscarriage @ 6mos   • ENDOMETRIAL ABLATION  2012   • ENDOSCOPY     • ENDOSCOPY N/A 9/23/2022    Procedure: ESOPHAGOGASTRODUODENOSCOPY;  Surgeon: Jluis Del Valle MD;  Location:  [facility] OR;  Service: Bariatric;  Laterality: N/A;   • GASTRECTOMY N/A 9/23/2022    Procedure: GASTRECTOMY LAPAROSCOPIC;  Surgeon: Jluis Del Valle MD;  Location:  [facility] OR;  Service: Bariatric;  Laterality: N/A;   • WISDOM TOOTH EXTRACTION       Outpatient Medications Marked as Taking for the 9/29/22 encounter (Office Visit) with Roberta Rivas PA-C   Medication Sig Dispense Refill   • busPIRone (BUSPAR) 7.5 MG tablet Take 7.5 mg by mouth 2 (Two) Times a Day.     • dilTIAZem CD (CARDIZEM CD) 180 MG 24 hr capsule Take 180 mg by mouth Daily.     • Enoxaparin Sodium (LOVENOX) 40 MG/0.4ML solution prefilled syringe syringe Inject 0.4 mL under the skin into the appropriate area as directed Daily for 21 days. AFTER discharge home from surgery 8.4 mL 0   • esomeprazole (nexIUM) 40 MG capsule Take 40 mg by mouth Every Morning Before Breakfast.     • fexofenadine (ALLEGRA) 180 MG tablet Take 180 mg by mouth Daily.     • levothyroxine (SYNTHROID, LEVOTHROID) 75 MCG tablet Take 75 mcg by mouth Daily.     • losartan (COZAAR) 50 MG tablet Take 50 mg by mouth 2 (Two) Times a Day.     • Prenatal Vit-Fe Fumarate-FA (prenatal vitamin 27-0.8) 27-0.8 MG tablet tablet Take 1 tablet by mouth Daily.     • rosuvastatin (CRESTOR) 10 MG tablet Take 10 mg by mouth Daily.     • SITagliptin (JANUVIA) 100 MG tablet Take 100 mg by mouth Daily.     • Zinc 100 MG tablet Take 50 mg by mouth 2 (Two) Times a Day.       Allergies   Allergen Reactions   • Tuberculin Tests Swelling     Allergy to tuberculin serum, entire arm swelled with PPD       Social History     Socioeconomic History   • Marital status:    Tobacco Use   • Smoking status: Never Smoker   •  "Smokeless tobacco: Never Used   Vaping Use   • Vaping Use: Never used   Substance and Sexual Activity   • Alcohol use: Not Currently     Comment: rare   • Drug use: Never   • Sexual activity: Yes     Partners: Male     Birth control/protection: None     Comment: My        /70   Pulse 91   Temp 97.8 °F (36.6 °C)   Resp 16   Ht 160 cm (62.99\")   Wt 114 kg (252 lb)   SpO2 97%   BMI 44.65 kg/m²   Physical Exam  Constitutional:       Appearance: She is well-developed. She is obese.   HENT:      Head: Normocephalic and atraumatic.   Cardiovascular:      Rate and Rhythm: Normal rate and regular rhythm.   Pulmonary:      Effort: Pulmonary effort is normal.      Breath sounds: Normal breath sounds.   Abdominal:      General: Bowel sounds are normal.      Palpations: Abdomen is soft.      Comments: Incisions healing well   Skin:     General: Skin is warm and dry.   Neurological:      Mental Status: She is alert.   Psychiatric:         Mood and Affect: Mood normal.         Behavior: Behavior normal.         Thought Content: Thought content normal.         Judgment: Judgment normal.           Assessment:   POD #6 s/p LSG/ jayson by  on 9/23/22    ICD-10-CM ICD-9-CM   1. Status post bariatric surgery  Z98.84 V45.86   2. Obesity, Class III, BMI 40-49.9 (morbid obesity) (HCC)  E66.01 278.01           Plan:  Doing well. Continue to advance diet per manual.  Increase protein intake to 100g/day.  Increase exercise/activity as tolerated.  Reviewed lifting restrictions, nothing >25 lbs x 2 more weeks.  start vitamins.  Continue PPI.  Cont lovenox. Continue to avoid ASA/NSAIDs/tramadol/tobacco x 6 weeks postop, steroids x 8 weeks postop.  Call w/ problems/concerns.    Class 3 Severe Obesity (BMI >=40). Obesity-related health conditions include the following: as above. Obesity is improving with treatment. BMI is is above average; BMI management plan is completed. We discussed low calorie, low carb based diet " program, portion control and increasing exercise.        The patient was instructed to follow up in 3 weeks, sooner if needed.

## 2022-10-19 ENCOUNTER — OFFICE VISIT (OUTPATIENT)
Dept: BARIATRICS/WEIGHT MGMT | Facility: CLINIC | Age: 52
End: 2022-10-19

## 2022-10-19 VITALS
TEMPERATURE: 98 F | WEIGHT: 236 LBS | OXYGEN SATURATION: 98 % | SYSTOLIC BLOOD PRESSURE: 110 MMHG | RESPIRATION RATE: 18 BRPM | HEIGHT: 63 IN | BODY MASS INDEX: 41.82 KG/M2 | HEART RATE: 76 BPM | DIASTOLIC BLOOD PRESSURE: 64 MMHG

## 2022-10-19 DIAGNOSIS — Z98.84 STATUS POST BARIATRIC SURGERY: ICD-10-CM

## 2022-10-19 DIAGNOSIS — E66.01 OBESITY, CLASS III, BMI 40-49.9 (MORBID OBESITY): Primary | ICD-10-CM

## 2022-10-19 DIAGNOSIS — Z90.3 POSTGASTRECTOMY MALABSORPTION: ICD-10-CM

## 2022-10-19 DIAGNOSIS — R53.83 FATIGUE, UNSPECIFIED TYPE: ICD-10-CM

## 2022-10-19 DIAGNOSIS — E55.9 HYPOVITAMINOSIS D: ICD-10-CM

## 2022-10-19 DIAGNOSIS — Z13.21 MALNUTRITION SCREEN: ICD-10-CM

## 2022-10-19 DIAGNOSIS — K91.2 POSTGASTRECTOMY MALABSORPTION: ICD-10-CM

## 2022-10-19 DIAGNOSIS — Z13.0 SCREENING, IRON DEFICIENCY ANEMIA: ICD-10-CM

## 2022-10-19 PROCEDURE — 99024 POSTOP FOLLOW-UP VISIT: CPT | Performed by: PHYSICIAN ASSISTANT

## 2022-10-19 RX ORDER — HYDROCHLOROTHIAZIDE 12.5 MG/1
12.5 CAPSULE, GELATIN COATED ORAL DAILY
COMMUNITY
Start: 2022-09-30

## 2022-10-19 NOTE — PROGRESS NOTES
[facility] Bariatric Surgery  [address]  [address]  [facility] [id number] [phone]      Patient Name:  Emily Germain.  :  1970      Reason for Visit:  1 month postop    HPI:  Emily Germain is a 52 y.o. female  s/p LSG/ jayson by  on 22    Doing well.  Pleased with progress.  Previously used steroid injections in the knees for joint pain and has not had knee pain issue since surgery.  She had a scabbed area inferior to left lateral incision site with something poking sticking out that she pulled out recently.  No drainage, pain or fever.    No other issues/concerns. Denies dysphagia, reflux, nausea, vomiting and abdominal pain.  Tolerating diet progression - on stage 3.  Getting 90+g prot/day.  Drinking 64+ fluid oz/day.  Prenatal MVI + patches MVI, D, B12 and biotin.   On Nexium.  Still holding ASA , NSAIDs , Tramadol, Hormones, Diuretics , Steroids and Immunologics.  Active working second shift at hospital.       Presurgery weight:  267 pounds. Today's weight is 107 kg (236 lb) pounds, today's Body mass index is 41.82 kg/m²., and@ weight loss since surgery is 31 pounds.       Past Medical History:   Diagnosis Date   • Anxiety and depression    • Cholelithiasis     dx 18+yrs ago, relatively asx   • Diabetes (HCC)     newly dx, no insulin, A1C >7   • Dyspepsia    • Dyspnea on exertion    • Edema     HCTZ 12.5mg daily   • Fatigue    • Gallstone    • GERD (gastroesophageal reflux disease)     on Nexium x 5+yrs, improves w/ wt.loss, remote EGD (20+yrs ago) unremarkable   • HTN (hypertension)    • Hyperlipidemia    • Hypothyroidism    • Knee pain     on Mobic, prn steroid injections (next due 3/2022)   • Morbid obesity (HCC)    • PCOS (polycystic ovarian syndrome)     does not tolerate Metformin   • Vitamin D deficiency    • Wears contact lenses    • Wears reading eyeglasses      Past Surgical History:   Procedure Laterality Date   • CHOLECYSTECTOMY N/A  9/23/2022    Procedure: CHOLECYSTECTOMY LAPAROSCOPIC;  Surgeon: Jluis Del Valle MD;  Location:  [facility] OR;  Service: Bariatric;  Laterality: N/A;  [id number]   • DILATATION AND CURETTAGE  1987    emergent, miscarriage @ 6mos   • ENDOMETRIAL ABLATION  2012   • ENDOSCOPY     • ENDOSCOPY N/A 9/23/2022    Procedure: ESOPHAGOGASTRODUODENOSCOPY;  Surgeon: Jluis Del Valle MD;  Location:  [facility] OR;  Service: Bariatric;  Laterality: N/A;   • GASTRECTOMY N/A 9/23/2022    Procedure: GASTRECTOMY LAPAROSCOPIC;  Surgeon: Jluis Del Valle MD;  Location:  [facility] OR;  Service: Bariatric;  Laterality: N/A;   • WISDOM TOOTH EXTRACTION       Outpatient Medications Marked as Taking for the 10/19/22 encounter (Office Visit) with Roberta Rivas PA-C   Medication Sig Dispense Refill   • busPIRone (BUSPAR) 7.5 MG tablet Take 7.5 mg by mouth 2 (Two) Times a Day.     • cloNIDine (CATAPRES) 0.1 MG tablet Take 0.1 mg by mouth As Needed for High Blood Pressure (greater than 165/95).     • dilTIAZem CD (CARDIZEM CD) 180 MG 24 hr capsule Take 180 mg by mouth Daily.     • esomeprazole (nexIUM) 40 MG capsule Take 40 mg by mouth Every Morning Before Breakfast.     • fexofenadine (ALLEGRA) 180 MG tablet Take 180 mg by mouth Daily.     • levothyroxine (SYNTHROID, LEVOTHROID) 75 MCG tablet Take 75 mcg by mouth Daily.     • losartan (COZAAR) 50 MG tablet Take 50 mg by mouth 2 (Two) Times a Day.     • rosuvastatin (CRESTOR) 10 MG tablet Take 10 mg by mouth Daily.     • SITagliptin (JANUVIA) 100 MG tablet Take 100 mg by mouth Daily.     • Zinc 100 MG tablet Take 50 mg by mouth 2 (Two) Times a Day.       Allergies   Allergen Reactions   • Tuberculin Tests Swelling     Allergy to tuberculin serum, entire arm swelled with PPD       Social History     Socioeconomic History   • Marital status:    Tobacco Use   • Smoking status: Never   • Smokeless tobacco: Never   Vaping Use   • Vaping Use: Never used   Substance and Sexual Activity   •  "Alcohol use: Not Currently     Comment: rare   • Drug use: Never   • Sexual activity: Yes     Partners: Male     Birth control/protection: None     Comment: My        /64   Pulse 76   Temp 98 °F (36.7 °C)   Resp 18   Ht 160 cm (62.99\")   Wt 107 kg (236 lb)   SpO2 98%   BMI 41.82 kg/m²     Physical Exam  Constitutional:       Appearance: She is well-developed. She is obese.   HENT:      Head: Normocephalic and atraumatic.   Cardiovascular:      Rate and Rhythm: Normal rate and regular rhythm.   Pulmonary:      Effort: Pulmonary effort is normal.      Breath sounds: Normal breath sounds.   Abdominal:      General: Bowel sounds are normal.      Palpations: Abdomen is soft.      Comments: Incisions healing well  Pin point scab inferior to L lateral scar removed, no open wound or noted abnormalities of soft tissue   Skin:     General: Skin is warm and dry.   Neurological:      Mental Status: She is alert.   Psychiatric:         Behavior: Behavior normal.           Assessment:  1 month s/p LSG/ jayson by  on 9/23/22    ICD-10-CM ICD-9-CM   1. Obesity, Class III, BMI 40-49.9 (morbid obesity) ([facility])  E66.01 278.01   2. Fatigue, unspecified type  R53.83 780.79   3. Status post bariatric surgery  Z98.84 V45.86   4. Hypovitaminosis D  E55.9 268.9   5. Screening, iron deficiency anemia  Z13.0 V78.0   6. Malnutrition screen  Z13.21 V77.2   7. Postgastrectomy malabsorption  K91.2 579.3    Z90.3          Plan:  Doing well. Post nasal drainage likely contributing to nausea, can try alternate antihistamine, if persists, we can consider evaluation with imaging.  Can add fiber supplement to help with regular bowel movements.  Continue to advance diet per manual.  Continue protein 70-100g/day.  Encouraged good food choices - high protein, low carb.  Continue fluids 64+oz per day. Continue routine exercise, lifting restrictions lifted.  Continue PPI. Continue to avoid NSAIDS, ASA, tramadol, tobacco x 6 weeks " postop, steroids x 8 weeks postop.  Routine bariatric labs ordered.  Continue vitamins w/ adjustments pending lab results.  Call w/ problems/concerns.    Class 3 Severe Obesity (BMI >=40). Obesity-related health conditions include the following: as above. Obesity is worsening. BMI is is above average; BMI management plan is completed. We discussed low calorie, low carb based diet program, portion control and increasing exercise.        The patient was instructed to follow up in 2 months, sooner if needed.

## 2022-10-24 LAB
25(OH)D3+25(OH)D2 SERPL-MCNC: 48 NG/ML (ref 30–100)
ALBUMIN SERPL-MCNC: 4.7 G/DL (ref 3.5–5.2)
ALBUMIN/GLOB SERPL: 1.9 G/DL
ALP SERPL-CCNC: 104 U/L (ref 39–117)
ALT SERPL-CCNC: 25 U/L (ref 1–33)
AST SERPL-CCNC: 20 U/L (ref 1–32)
BASOPHILS # BLD AUTO: 0.04 10*3/MM3 (ref 0–0.2)
BASOPHILS NFR BLD AUTO: 0.3 % (ref 0–1.5)
BILIRUB SERPL-MCNC: 0.2 MG/DL (ref 0–1.2)
BUN SERPL-MCNC: 18 MG/DL (ref 6–20)
BUN/CREAT SERPL: 20.7 (ref 7–25)
CALCIUM SERPL-MCNC: 10.4 MG/DL (ref 8.6–10.5)
CHLORIDE SERPL-SCNC: 103 MMOL/L (ref 98–107)
CO2 SERPL-SCNC: 26 MMOL/L (ref 22–29)
CREAT SERPL-MCNC: 0.87 MG/DL (ref 0.57–1)
EGFRCR SERPLBLD CKD-EPI 2021: 79.8 ML/MIN/1.73
EOSINOPHIL # BLD AUTO: 0.14 10*3/MM3 (ref 0–0.4)
EOSINOPHIL NFR BLD AUTO: 1.1 % (ref 0.3–6.2)
ERYTHROCYTE [DISTWIDTH] IN BLOOD BY AUTOMATED COUNT: 14.6 % (ref 12.3–15.4)
FERRITIN SERPL-MCNC: 49.7 NG/ML (ref 13–150)
FOLATE SERPL-MCNC: >20 NG/ML (ref 4.78–24.2)
GLOBULIN SER CALC-MCNC: 2.5 GM/DL
GLUCOSE SERPL-MCNC: 110 MG/DL (ref 65–99)
HCT VFR BLD AUTO: 44.3 % (ref 34–46.6)
HGB BLD-MCNC: 14.6 G/DL (ref 12–15.9)
IMM GRANULOCYTES # BLD AUTO: 0.03 10*3/MM3 (ref 0–0.05)
IMM GRANULOCYTES NFR BLD AUTO: 0.2 % (ref 0–0.5)
IRON SERPL-MCNC: 46 MCG/DL (ref 37–145)
LYMPHOCYTES # BLD AUTO: 4.06 10*3/MM3 (ref 0.7–3.1)
LYMPHOCYTES NFR BLD AUTO: 32.4 % (ref 19.6–45.3)
MCH RBC QN AUTO: 28.2 PG (ref 26.6–33)
MCHC RBC AUTO-ENTMCNC: 33 G/DL (ref 31.5–35.7)
MCV RBC AUTO: 85.5 FL (ref 79–97)
METHYLMALONATE SERPL-SCNC: 153 NMOL/L (ref 0–378)
MONOCYTES # BLD AUTO: 1.04 10*3/MM3 (ref 0.1–0.9)
MONOCYTES NFR BLD AUTO: 8.3 % (ref 5–12)
NEUTROPHILS # BLD AUTO: 7.23 10*3/MM3 (ref 1.7–7)
NEUTROPHILS NFR BLD AUTO: 57.7 % (ref 42.7–76)
NRBC BLD AUTO-RTO: 0 /100 WBC (ref 0–0.2)
PLATELET # BLD AUTO: 342 10*3/MM3 (ref 140–450)
POTASSIUM SERPL-SCNC: 4.4 MMOL/L (ref 3.5–5.2)
PREALB SERPL-MCNC: 25 MG/DL (ref 10–36)
PROT SERPL-MCNC: 7.2 G/DL (ref 6–8.5)
RBC # BLD AUTO: 5.18 10*6/MM3 (ref 3.77–5.28)
SODIUM SERPL-SCNC: 145 MMOL/L (ref 136–145)
VIT B1 BLD-SCNC: 202.3 NMOL/L (ref 66.5–200)
WBC # BLD AUTO: 12.54 10*3/MM3 (ref 3.4–10.8)

## 2023-01-23 ENCOUNTER — OFFICE VISIT (OUTPATIENT)
Dept: BARIATRICS/WEIGHT MGMT | Facility: CLINIC | Age: 53
End: 2023-01-23
Payer: COMMERCIAL

## 2023-01-23 VITALS
WEIGHT: 204.5 LBS | DIASTOLIC BLOOD PRESSURE: 82 MMHG | TEMPERATURE: 98 F | BODY MASS INDEX: 34.91 KG/M2 | SYSTOLIC BLOOD PRESSURE: 130 MMHG | HEIGHT: 64 IN | OXYGEN SATURATION: 98 % | HEART RATE: 68 BPM | RESPIRATION RATE: 16 BRPM

## 2023-01-23 DIAGNOSIS — K91.2 POSTGASTRECTOMY MALABSORPTION: ICD-10-CM

## 2023-01-23 DIAGNOSIS — Z98.84 STATUS POST BARIATRIC SURGERY: ICD-10-CM

## 2023-01-23 DIAGNOSIS — Z13.21 MALNUTRITION SCREEN: ICD-10-CM

## 2023-01-23 DIAGNOSIS — Z90.3 POSTGASTRECTOMY MALABSORPTION: ICD-10-CM

## 2023-01-23 DIAGNOSIS — R53.83 FATIGUE, UNSPECIFIED TYPE: ICD-10-CM

## 2023-01-23 DIAGNOSIS — E55.9 HYPOVITAMINOSIS D: ICD-10-CM

## 2023-01-23 DIAGNOSIS — E66.9 OBESITY, CLASS II, BMI 35-39.9: Primary | ICD-10-CM

## 2023-01-23 DIAGNOSIS — Z13.0 SCREENING, IRON DEFICIENCY ANEMIA: ICD-10-CM

## 2023-01-23 PROCEDURE — 99214 OFFICE O/P EST MOD 30 MIN: CPT | Performed by: PHYSICIAN ASSISTANT

## 2023-01-23 RX ORDER — BUSPIRONE HYDROCHLORIDE 15 MG/1
7.5 TABLET ORAL 2 TIMES DAILY
COMMUNITY
Start: 2023-01-16 | End: 2023-01-23 | Stop reason: DRUGHIGH

## 2023-01-23 RX ORDER — MELOXICAM 15 MG/1
15 TABLET ORAL DAILY
COMMUNITY
Start: 2023-01-16

## 2023-01-23 NOTE — PROGRESS NOTES
[facility] Bariatric Surgery  [address]  [address]  [facility] [id number] [phone]        Patient Name:  Emily Germain.  :  1970      Reason for Visit:   4 months postop      HPI: Emily Germain is a 52 y.o. female s/p LSG/ jayson by  on 22    Doing well. pleased with progress, feeling good. Had 2 episodes of water feeling like went down with air bubble and came back up,   No other issues/concerns. Denies dysphagia, reflux, nausea, vomiting, abdominal pain, pulmonary issues and fevers.  Getting 60-90g prot/day. Eating regular meals, tuna, chicken, etc. 1 shake a day.   Drinking 64+ fluid oz/day.  1 month labs revealed bariatric levels wnl. Taking Patches: MVI + B12 + iron + D/ca + hair.  On Nexium prn.  Exercising- walks at hospital BID on breaks, weights at home.     Presurgery weight: 267 pounds.  Today's weight is 92.8 kg (204 lb 8 oz) pounds, today's  Body mass index is 35.66 kg/m²., and@ weight loss since surgery is  63 pounds.      Past Medical History:   Diagnosis Date   • Anxiety and depression    • Cholelithiasis     dx 18+yrs ago, relatively asx   • Diabetes (HCC)     newly dx, no insulin, A1C >7   • Dyspepsia    • Dyspnea on exertion    • Edema     HCTZ 12.5mg daily   • Fatigue    • Gallstone    • GERD (gastroesophageal reflux disease)     on Nexium x 5+yrs, improves w/ wt.loss, remote EGD (20+yrs ago) unremarkable   • HTN (hypertension)    • Hyperlipidemia    • Hypothyroidism    • Knee pain     on Mobic, prn steroid injections (next due 3/2022)   • Morbid obesity (HCC)    • PCOS (polycystic ovarian syndrome)     does not tolerate Metformin   • Vitamin D deficiency    • Wears contact lenses    • Wears reading eyeglasses      Past Surgical History:   Procedure Laterality Date   • CHOLECYSTECTOMY N/A 2022    Procedure: CHOLECYSTECTOMY LAPAROSCOPIC;  Surgeon: Jluis Del Valle MD;  Location: [facility];  Service: Bariatric;  Laterality:  N/A;  [id number]   • DILATATION AND CURETTAGE  1987    emergent, miscarriage @ 6mos   • ENDOMETRIAL ABLATION  2012   • ENDOSCOPY     • ENDOSCOPY N/A 09/23/2022    Procedure: ESOPHAGOGASTRODUODENOSCOPY;  Surgeon: Jluis Del Valle MD;  Location:  [facility];  Service: Bariatric;  Laterality: N/A;   • GASTRECTOMY N/A 09/23/2022    Procedure: GASTRECTOMY LAPAROSCOPIC;  Surgeon: Jluis Del Valle MD;  Location:  [facility];  Service: Bariatric;  Laterality: N/A;   • WISDOM TOOTH EXTRACTION       Outpatient Medications Marked as Taking for the 1/23/23 encounter (Office Visit) with Roberta Rivas PA-C   Medication Sig Dispense Refill   • busPIRone (BUSPAR) 7.5 MG tablet Take 7.5 mg by mouth 2 (Two) Times a Day.     • cloNIDine (CATAPRES) 0.1 MG tablet Take 0.1 mg by mouth As Needed for High Blood Pressure (greater than 165/95).     • dilTIAZem CD (CARDIZEM CD) 180 MG 24 hr capsule Take 180 mg by mouth Daily.     • esomeprazole (nexIUM) 40 MG capsule Take 40 mg by mouth Every Morning Before Breakfast.     • fexofenadine (ALLEGRA) 180 MG tablet Take 180 mg by mouth Daily.     • hydroCHLOROthiazide (MICROZIDE) 12.5 MG capsule Take 1 capsule by mouth Daily.     • levothyroxine (SYNTHROID, LEVOTHROID) 75 MCG tablet Take 75 mcg by mouth Daily.     • losartan (COZAAR) 50 MG tablet Take 50 mg by mouth Daily.     • meloxicam (MOBIC) 15 MG tablet Take 15 mg by mouth Daily.     • Prenatal Vit-Fe Fumarate-FA (prenatal vitamin 27-0.8) 27-0.8 MG tablet tablet Take 1 tablet by mouth Daily.     • rosuvastatin (CRESTOR) 10 MG tablet Take 10 mg by mouth Daily.     • SITagliptin (JANUVIA) 100 MG tablet Take 100 mg by mouth Daily.     • Zinc 100 MG tablet Take 50 mg by mouth 2 (Two) Times a Day.         Allergies   Allergen Reactions   • Tuberculin Tests Swelling     Allergy to tuberculin serum, entire arm swelled with PPD       Social History     Socioeconomic History   • Marital status:    Tobacco Use   • Smoking status: Never  "  • Smokeless tobacco: Never   Vaping Use   • Vaping Use: Never used   Substance and Sexual Activity   • Alcohol use: Not Currently     Comment: rare   • Drug use: Never   • Sexual activity: Yes     Partners: Male     Birth control/protection: None     Comment: My        /82 (BP Location: Left arm, Patient Position: Sitting)   Pulse 68   Temp 98 °F (36.7 °C)   Resp 16   Ht 161.3 cm (63.5\")   Wt 92.8 kg (204 lb 8 oz)   SpO2 98%   BMI 35.66 kg/m²     Physical Exam  Constitutional:       Appearance: She is well-developed. She is obese.   HENT:      Head: Normocephalic and atraumatic.   Cardiovascular:      Rate and Rhythm: Normal rate and regular rhythm.   Pulmonary:      Effort: Pulmonary effort is normal.      Breath sounds: Normal breath sounds.   Abdominal:      General: Bowel sounds are normal.      Palpations: Abdomen is soft.      Comments: Incisions healing well   Skin:     General: Skin is warm and dry.   Neurological:      Mental Status: She is alert.   Psychiatric:         Mood and Affect: Mood normal.         Behavior: Behavior normal.         Thought Content: Thought content normal.         Judgment: Judgment normal.           Assessment:  4 months s/p LSG/ jayson by  on 9/23/22    ICD-10-CM ICD-9-CM   1. Obesity, Class II, BMI 35-39.9  E66.9 278.00   2. Fatigue, unspecified type  R53.83 780.79   3. Hypovitaminosis D  E55.9 268.9   4. Screening, iron deficiency anemia  Z13.0 V78.0   5. Malnutrition screen  Z13.21 V77.2   6. Postgastrectomy malabsorption  K91.2 579.3    Z90.3    7. Status post bariatric surgery  Z98.84 V45.86         Plan:  Doing well. Continue w/ good food choices and healthy habits.  Continue protein 70-100g/day.  Continue fluids 64+oz daily. Continue routine exercise.  Routine bariatric labs ordered.  Continue vitamins w/ adjustments pending lab results.  Call w/ problems/concerns.     Class 2 Severe Obesity (BMI >=35 and <=39.9). Obesity-related health " conditions include the following: as above. Obesity is improving with treatment. BMI is is above average; BMI management plan is completed. We discussed low calorie, low carb based diet program, portion control and increasing exercise.        The patient was instructed to follow up in 3 months, sooner if needed.    I spent 30 minutes caring for Emily on this date of service. This time includes time spent by me in the following activities: preparing for the visit, reviewing tests, counseling and educating the patient/family/caregiver, ordering medications, tests, or procedures and documenting information in the medical record.

## 2023-01-28 LAB
25(OH)D3+25(OH)D2 SERPL-MCNC: 38.6 NG/ML (ref 30–100)
ALBUMIN SERPL-MCNC: 4.6 G/DL (ref 3.8–4.9)
ALBUMIN/GLOB SERPL: 1.8 {RATIO} (ref 1.2–2.2)
ALP SERPL-CCNC: 102 IU/L (ref 44–121)
ALT SERPL-CCNC: 20 IU/L (ref 0–32)
AST SERPL-CCNC: 16 IU/L (ref 0–40)
BASOPHILS # BLD AUTO: 0 X10E3/UL (ref 0–0.2)
BASOPHILS NFR BLD AUTO: 0 %
BILIRUB SERPL-MCNC: 0.3 MG/DL (ref 0–1.2)
BUN SERPL-MCNC: 25 MG/DL (ref 6–24)
BUN/CREAT SERPL: 32 (ref 9–23)
CALCIUM SERPL-MCNC: 10 MG/DL (ref 8.7–10.2)
CHLORIDE SERPL-SCNC: 105 MMOL/L (ref 96–106)
CO2 SERPL-SCNC: 23 MMOL/L (ref 20–29)
CREAT SERPL-MCNC: 0.79 MG/DL (ref 0.57–1)
EGFRCR SERPLBLD CKD-EPI 2021: 89 ML/MIN/1.73
EOSINOPHIL # BLD AUTO: 0.2 X10E3/UL (ref 0–0.4)
EOSINOPHIL NFR BLD AUTO: 2 %
ERYTHROCYTE [DISTWIDTH] IN BLOOD BY AUTOMATED COUNT: 13.9 % (ref 11.7–15.4)
FERRITIN SERPL-MCNC: 46 NG/ML (ref 15–150)
FOLATE SERPL-MCNC: >20 NG/ML
GLOBULIN SER CALC-MCNC: 2.5 G/DL (ref 1.5–4.5)
GLUCOSE SERPL-MCNC: 100 MG/DL (ref 70–99)
HCT VFR BLD AUTO: 45.6 % (ref 34–46.6)
HGB BLD-MCNC: 15 G/DL (ref 11.1–15.9)
IMM GRANULOCYTES # BLD AUTO: 0 X10E3/UL (ref 0–0.1)
IMM GRANULOCYTES NFR BLD AUTO: 0 %
IRON SERPL-MCNC: 62 UG/DL (ref 27–159)
LYMPHOCYTES # BLD AUTO: 4 X10E3/UL (ref 0.7–3.1)
LYMPHOCYTES NFR BLD AUTO: 35 %
MCH RBC QN AUTO: 28.5 PG (ref 26.6–33)
MCHC RBC AUTO-ENTMCNC: 32.9 G/DL (ref 31.5–35.7)
MCV RBC AUTO: 87 FL (ref 79–97)
METHYLMALONATE SERPL-SCNC: 151 NMOL/L (ref 0–378)
MONOCYTES # BLD AUTO: 0.8 X10E3/UL (ref 0.1–0.9)
MONOCYTES NFR BLD AUTO: 7 %
NEUTROPHILS # BLD AUTO: 6.3 X10E3/UL (ref 1.4–7)
NEUTROPHILS NFR BLD AUTO: 56 %
PLATELET # BLD AUTO: 289 X10E3/UL (ref 150–450)
POTASSIUM SERPL-SCNC: 4.7 MMOL/L (ref 3.5–5.2)
PREALB SERPL-MCNC: 25 MG/DL (ref 10–36)
PROT SERPL-MCNC: 7.1 G/DL (ref 6–8.5)
RBC # BLD AUTO: 5.26 X10E6/UL (ref 3.77–5.28)
SODIUM SERPL-SCNC: 144 MMOL/L (ref 134–144)
VIT B1 BLD-SCNC: 218.7 NMOL/L (ref 66.5–200)
WBC # BLD AUTO: 11.4 X10E3/UL (ref 3.4–10.8)

## 2023-03-09 ENCOUNTER — OFFICE VISIT (OUTPATIENT)
Dept: CARDIOLOGY | Facility: CLINIC | Age: 53
End: 2023-03-09
Payer: COMMERCIAL

## 2023-03-09 VITALS
BODY MASS INDEX: 37.54 KG/M2 | HEART RATE: 66 BPM | SYSTOLIC BLOOD PRESSURE: 115 MMHG | WEIGHT: 204 LBS | DIASTOLIC BLOOD PRESSURE: 67 MMHG | HEIGHT: 62 IN | OXYGEN SATURATION: 98 %

## 2023-03-09 DIAGNOSIS — R53.83 OTHER FATIGUE: ICD-10-CM

## 2023-03-09 DIAGNOSIS — R06.09 DYSPNEA ON EXERTION: Primary | ICD-10-CM

## 2023-03-09 DIAGNOSIS — I10 PRIMARY HYPERTENSION: ICD-10-CM

## 2023-03-09 PROCEDURE — 3074F SYST BP LT 130 MM HG: CPT | Performed by: PHYSICIAN ASSISTANT

## 2023-03-09 PROCEDURE — 1159F MED LIST DOCD IN RCRD: CPT | Performed by: PHYSICIAN ASSISTANT

## 2023-03-09 PROCEDURE — 1160F RVW MEDS BY RX/DR IN RCRD: CPT | Performed by: PHYSICIAN ASSISTANT

## 2023-03-09 PROCEDURE — 99213 OFFICE O/P EST LOW 20 MIN: CPT | Performed by: PHYSICIAN ASSISTANT

## 2023-03-09 PROCEDURE — 3078F DIAST BP <80 MM HG: CPT | Performed by: PHYSICIAN ASSISTANT

## 2023-03-09 NOTE — PROGRESS NOTES
Problem list     Subjective   Emily Germain is a 52 y.o. female     Chief Complaint   Patient presents with   • Follow-up     Dyspnea on exertion       HPI  The patient presents in the clinic today for routine evaluation and follow-up.  She has no significant cardiovascular history, but has been followed through the clinic historically because of symptoms and risk factors.  Since last evaluation here, the patient is continued to do fairly well.  She did have bariatric surgery.  She has now lost 72 pounds by her report.  She feels markedly improved.  Her dyspnea has minimized.  She has no further chest pain.  She denies edema at this time.  She has no failure nor dysrhythmic symptoms.  Blood pressures have normalized and she actually has had a diminished dosing in antihypertensive therapy utilized previously.  All labs are slowly improving as well including lipid and glucose parameters.  She has no further complaints and is doing very well at this time.    Current Outpatient Medications on File Prior to Visit   Medication Sig Dispense Refill   • busPIRone (BUSPAR) 7.5 MG tablet Take 1 tablet by mouth 2 (Two) Times a Day.     • cloNIDine (CATAPRES) 0.1 MG tablet Take 1 tablet by mouth As Needed for High Blood Pressure (greater than 165/95).     • dilTIAZem CD (CARDIZEM CD) 180 MG 24 hr capsule Take 1 capsule by mouth Daily.     • esomeprazole (nexIUM) 40 MG capsule Take 1 capsule by mouth Every Morning Before Breakfast.     • fexofenadine (ALLEGRA) 180 MG tablet Take 1 tablet by mouth Daily.     • hydroCHLOROthiazide (MICROZIDE) 12.5 MG capsule Take 1 capsule by mouth Daily.     • levothyroxine (SYNTHROID, LEVOTHROID) 75 MCG tablet Take 1 tablet by mouth Daily.     • losartan (COZAAR) 50 MG tablet Take 25 mg by mouth Daily.     • meloxicam (MOBIC) 15 MG tablet Take 1 tablet by mouth Daily.     • NON FORMULARY Multivitamin patches     • Prenatal Vit-Fe Fumarate-FA (prenatal vitamin 27-0.8) 27-0.8 MG tablet  tablet Take 1 tablet by mouth Daily.     • rosuvastatin (CRESTOR) 10 MG tablet Take 1 tablet by mouth Daily.     • SITagliptin (JANUVIA) 100 MG tablet Take 1 tablet by mouth Daily.     • Zinc 100 MG tablet Take 50 mg by mouth 2 (Two) Times a Day.       No current facility-administered medications on file prior to visit.       Tuberculin tests    Past Medical History:   Diagnosis Date   • Anxiety and depression    • Cholelithiasis     dx 18+yrs ago, relatively asx   • Diabetes (HCC)     newly dx, no insulin, A1C >7   • Dyspepsia    • Dyspnea on exertion    • Edema     HCTZ 12.5mg daily   • Fatigue    • Gallstone    • GERD (gastroesophageal reflux disease)     on Nexium x 5+yrs, improves w/ wt.loss, remote EGD (20+yrs ago) unremarkable   • HTN (hypertension)    • Hyperlipidemia    • Hypothyroidism    • Knee pain     on Mobic, prn steroid injections (next due 3/2022)   • Morbid obesity (HCC)    • PCOS (polycystic ovarian syndrome)     does not tolerate Metformin   • Vitamin D deficiency    • Wears contact lenses    • Wears reading eyeglasses        Social History     Socioeconomic History   • Marital status:    Tobacco Use   • Smoking status: Never   • Smokeless tobacco: Never   Vaping Use   • Vaping Use: Never used   Substance and Sexual Activity   • Alcohol use: Not Currently     Comment: rare   • Drug use: Never   • Sexual activity: Yes     Partners: Male     Birth control/protection: None     Comment: My        Family History   Problem Relation Age of Onset   • Breast cancer Maternal Grandmother    • Hypertension Maternal Grandmother    • Cancer Maternal Grandmother         Breast   • Depression Maternal Grandmother         Bi-poler 1972   • Mental illness Maternal Grandmother    • Miscarriages / Stillbirths Maternal Grandmother    • Hypertension Mother    • Diabetes Father    • Hypertension Father    • Heart disease Father    • Hypertension Maternal Grandfather    • Heart attack Maternal Grandfather   "  • Heart disease Maternal Grandfather         Passed from massive MI   • Hypertension Paternal Grandmother    • Diabetes Paternal Grandfather    • Hypertension Paternal Grandfather    • Heart disease Maternal Uncle    • Miscarriages / Stillbirths Sister         1987 D& C       Review of Systems   Constitutional: Negative.  Negative for activity change, appetite change, chills, fatigue and fever.   HENT: Negative.    Eyes: Negative for visual disturbance.   Respiratory: Negative.  Negative for apnea, cough, chest tightness, shortness of breath and wheezing.    Cardiovascular: Negative.  Negative for chest pain, palpitations and leg swelling.   Gastrointestinal: Negative.  Negative for blood in stool.   Endocrine: Positive for cold intolerance and heat intolerance.   Genitourinary: Negative.  Negative for hematuria.   Musculoskeletal: Negative.  Negative for arthralgias, back pain, gait problem, joint swelling, neck pain and neck stiffness.   Skin: Negative.  Negative for color change, rash and wound.   Allergic/Immunologic: Positive for environmental allergies. Negative for food allergies.   Neurological: Negative.  Negative for dizziness, syncope, weakness, light-headedness, numbness and headaches.   Hematological: Negative.  Does not bruise/bleed easily.   Psychiatric/Behavioral: Negative.  Negative for sleep disturbance.       Objective   Vitals:    03/09/23 0851   BP: 115/67   BP Location: Left arm   Patient Position: Sitting   Cuff Size: Adult   Pulse: 66   SpO2: 98%   Weight: 92.5 kg (204 lb)   Height: 157.5 cm (62\")      /67 (BP Location: Left arm, Patient Position: Sitting, Cuff Size: Adult)   Pulse 66   Ht 157.5 cm (62\")   Wt 92.5 kg (204 lb)   SpO2 98%   BMI 37.31 kg/m²    Lab Results (most recent)     None        Physical Exam  Vitals and nursing note reviewed.   Constitutional:       General: She is not in acute distress.     Appearance: She is well-developed.   HENT:      Head: Normocephalic " and atraumatic.   Eyes:      Conjunctiva/sclera: Conjunctivae normal.      Pupils: Pupils are equal, round, and reactive to light.   Neck:      Vascular: No JVD.      Trachea: No tracheal deviation.   Cardiovascular:      Rate and Rhythm: Normal rate and regular rhythm.      Heart sounds: Normal heart sounds.   Pulmonary:      Effort: Pulmonary effort is normal.      Breath sounds: Normal breath sounds.   Abdominal:      General: Bowel sounds are normal. There is no distension.      Palpations: Abdomen is soft. There is no mass.      Tenderness: There is no abdominal tenderness. There is no guarding or rebound.   Musculoskeletal:         General: No tenderness or deformity. Normal range of motion.      Cervical back: Normal range of motion and neck supple.   Skin:     General: Skin is warm and dry.      Coloration: Skin is not pale.      Findings: No erythema or rash.   Neurological:      Mental Status: She is alert and oriented to person, place, and time.   Psychiatric:         Behavior: Behavior normal.         Thought Content: Thought content normal.         Judgment: Judgment normal.           Procedure   Procedures       Assessment & Plan      Diagnosis Plan   1. Dyspnea on exertion        2. Primary hypertension        3. Other fatigue          1.  At this time, the patient appears to be doing well.  Dyspnea and fatigue are nonproblematic at this time.  She has no chest pain.  Since bariatric surgery and significant weight loss, she feels better than she has in years.    2.  Blood pressures are now normal, all as outlined above.  She will monitor that but call for complications.    3.  We will make no adjustments.  Nothing further and we will continue to see her on a yearly evaluation, sooner for complications.           Patient did not bring med list or medicine bottles to appointment, med list has been reviewed and updated based on patient's knowledge of their meds.           Electronically signed by:

## 2023-04-26 ENCOUNTER — OFFICE VISIT (OUTPATIENT)
Dept: BARIATRICS/WEIGHT MGMT | Facility: CLINIC | Age: 53
End: 2023-04-26
Payer: COMMERCIAL

## 2023-04-26 VITALS
TEMPERATURE: 98.6 F | BODY MASS INDEX: 34.06 KG/M2 | SYSTOLIC BLOOD PRESSURE: 142 MMHG | WEIGHT: 199.5 LBS | HEIGHT: 64 IN | HEART RATE: 70 BPM | OXYGEN SATURATION: 98 % | RESPIRATION RATE: 16 BRPM | DIASTOLIC BLOOD PRESSURE: 70 MMHG

## 2023-04-26 DIAGNOSIS — E66.9 DIABETES MELLITUS TYPE 2 IN OBESE: ICD-10-CM

## 2023-04-26 DIAGNOSIS — Z90.3 POSTGASTRECTOMY MALABSORPTION: ICD-10-CM

## 2023-04-26 DIAGNOSIS — R79.0 ABNORMAL BLOOD LEVEL OF IRON: ICD-10-CM

## 2023-04-26 DIAGNOSIS — L30.4 INTERTRIGINOUS DERMATITIS ASSOCIATED WITH MOISTURE: ICD-10-CM

## 2023-04-26 DIAGNOSIS — E11.69 DIABETES MELLITUS TYPE 2 IN OBESE: ICD-10-CM

## 2023-04-26 DIAGNOSIS — E66.9 OBESITY, CLASS I, BMI 30-34.9: Primary | ICD-10-CM

## 2023-04-26 DIAGNOSIS — R53.83 FATIGUE, UNSPECIFIED TYPE: ICD-10-CM

## 2023-04-26 DIAGNOSIS — K91.2 POSTGASTRECTOMY MALABSORPTION: ICD-10-CM

## 2023-04-26 DIAGNOSIS — E55.9 VITAMIN D DEFICIENCY: ICD-10-CM

## 2023-04-26 RX ORDER — NYSTATIN 100000 [USP'U]/G
POWDER TOPICAL 3 TIMES DAILY
Qty: 60 G | Refills: 1 | Status: SHIPPED | OUTPATIENT
Start: 2023-04-26

## 2023-04-26 NOTE — PROGRESS NOTES
[facility] Bariatric Surgery  [address]  [address]  [facility] [id number] [phone]        Patient Name:  Emily Germain  :  1970      Date of Visit: 2023      Reason for Visit:   7 months postop      HPI: Emily Germain is a 53 y.o. female s/p LSG/jayson 22 GDW    Feeling good.  Has had some hair loss, but getting better.  Also starting to have skin irritation beneath the breast and abdomen.  No other issues/concerns.  Getting 70-80g prot/day.  Denies dysphagia, nausea, vomiting and abdominal pain.  Taking Nexium only prn.  Physical activity: getting 10k steps daily when working.    Labs 23 - reviewed/acceptable.  Wearing MVI, B12, Vit D, and biotin patchAID vitamins + PNV.      Presurgery weight: 267 pounds.  Today's weight is 90.5 kg (199 lb 8 oz) pounds, today's  Body mass index is 34.79 kg/m²., and weight loss since surgery is 68 pounds.        Past Medical History:   Diagnosis Date   • Anxiety and depression    • Cholelithiasis     dx 18+yrs ago, relatively asx   • Diabetes     newly dx, no insulin, A1C >7   • Dyspepsia    • Dyspnea on exertion    • Edema     HCTZ 12.5mg daily   • Fatigue    • Gallstone    • GERD (gastroesophageal reflux disease)     on Nexium x 5+yrs, improves w/ wt.loss, remote EGD (20+yrs ago) unremarkable   • HTN (hypertension)    • Hyperlipidemia    • Hypothyroidism    • Knee pain     on Mobic, prn steroid injections (next due 3/2022)   • Morbid obesity    • PCOS (polycystic ovarian syndrome)     does not tolerate Metformin   • Vitamin D deficiency    • Wears contact lenses    • Wears reading eyeglasses      Past Surgical History:   Procedure Laterality Date   • CHOLECYSTECTOMY N/A 2022    Procedure: CHOLECYSTECTOMY LAPAROSCOPIC;  Surgeon: Jluis Del Valle MD;  Location: [facility];  Service: Bariatric;  Laterality: N/A;  [id number]   • DILATATION AND CURETTAGE      emergent, miscarriage @ 6mos   • ENDOMETRIAL  ABLATION 2012   • ENDOSCOPY     • ENDOSCOPY N/A 09/23/2022    Procedure: ESOPHAGOGASTRODUODENOSCOPY;  Surgeon: Jluis Del Valle MD;  Location:  [facility];  Service: Bariatric;  Laterality: N/A;   • GASTRECTOMY N/A 09/23/2022    Procedure: GASTRECTOMY LAPAROSCOPIC;  Surgeon: Jluis Del Valle MD;  Location:  [facility];  Service: Bariatric;  Laterality: N/A;   • WISDOM TOOTH EXTRACTION       Outpatient Medications Marked as Taking for the 4/26/23 encounter (Office Visit) with Miriam Milligan PA   Medication Sig Dispense Refill   • busPIRone (BUSPAR) 7.5 MG tablet Take 1 tablet by mouth 2 (Two) Times a Day.     • cloNIDine (CATAPRES) 0.1 MG tablet Take 1 tablet by mouth As Needed for High Blood Pressure (greater than 165/95).     • dilTIAZem CD (CARDIZEM CD) 180 MG 24 hr capsule Take 1 capsule by mouth Daily.     • esomeprazole (nexIUM) 40 MG capsule Take 1 capsule by mouth Every Morning Before Breakfast.     • fexofenadine (ALLEGRA) 180 MG tablet Take 1 tablet by mouth Daily.     • hydroCHLOROthiazide (MICROZIDE) 12.5 MG capsule Take 1 capsule by mouth Daily.     • levothyroxine (SYNTHROID, LEVOTHROID) 75 MCG tablet Take 1 tablet by mouth Daily.     • losartan (COZAAR) 50 MG tablet Take 25 mg by mouth Daily.     • meloxicam (MOBIC) 15 MG tablet Take 1 tablet by mouth Daily.     • NON FORMULARY Multivitamin patches     • Prenatal Vit-Fe Fumarate-FA (prenatal vitamin 27-0.8) 27-0.8 MG tablet tablet Take 1 tablet by mouth Daily.     • rosuvastatin (CRESTOR) 10 MG tablet Take 1 tablet by mouth Daily.     • SITagliptin (JANUVIA) 100 MG tablet Take 1 tablet by mouth Every Other Day.     • Zinc 100 MG tablet Take 50 mg by mouth 2 (Two) Times a Day.         Allergies   Allergen Reactions   • Tuberculin Tests Swelling     Allergy to tuberculin serum, entire arm swelled with PPD       Social History     Socioeconomic History   • Marital status:    Tobacco Use   • Smoking status: Never   • Smokeless tobacco:  "Never   Vaping Use   • Vaping Use: Never used   Substance and Sexual Activity   • Alcohol use: Not Currently     Comment: rare   • Drug use: Never   • Sexual activity: Yes     Partners: Male     Birth control/protection: None     Comment: My      Social History     Social History Narrative    .  Lives in [place].  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  Now works at [employer].       /70 (BP Location: Left arm, Patient Position: Sitting)   Pulse 70   Temp 98.6 °F (37 °C)   Resp 16   Ht 161.3 cm (63.5\")   Wt 90.5 kg (199 lb 8 oz)   SpO2 98%   BMI 34.79 kg/m²     Physical Exam  Constitutional:       Appearance: She is well-developed.   Cardiovascular:      Rate and Rhythm: Normal rate.   Pulmonary:      Effort: Pulmonary effort is normal.   Musculoskeletal:         General: Normal range of motion.   Neurological:      Mental Status: She is alert.   Psychiatric:         Thought Content: Thought content normal.         Judgment: Judgment normal.           Assessment:  7 months s/p LSG/jayson 9/23/22 GDW      ICD-10-CM ICD-9-CM   1. Obesity, Class I, BMI 30-34.9  E66.9 278.00   2. Postgastrectomy malabsorption  K91.2 579.3    Z90.3    3. Diabetes mellitus type 2 in obese  E11.69 250.00    E66.9 278.00   4. Abnormal blood level of iron  R79.0 790.6   5. Vitamin D deficiency  E55.9 268.9   6. Fatigue, unspecified type  R53.83 780.79   7. Intertriginous dermatitis associated with moisture  L30.4 692.89       BMI is >= 30 and <35. (Class 1 Obesity). The following options were offered after discussion;: see plan      Plan:  Continue w/ good food choices and healthy habits.  Increase protein to 100-120g/day given hair loss concerns.  Start tracking intake w/ Baritastic.  Continue routine physical activity, increase as able.  Routine bariatric labs ordered.  Continue vitamins w/ adjustments pending lab results.  Will RX Nystatin powder.  Call w/ problems/concerns.     The " patient was instructed to follow up for annual eval, sooner if needed.      SNEHA Segura

## 2023-04-30 LAB
25(OH)D3+25(OH)D2 SERPL-MCNC: 32 NG/ML (ref 30–100)
ALBUMIN SERPL-MCNC: 4.4 G/DL (ref 3.8–4.9)
ALBUMIN/GLOB SERPL: 1.7 {RATIO} (ref 1.2–2.2)
ALP SERPL-CCNC: 102 IU/L (ref 44–121)
ALT SERPL-CCNC: 17 IU/L (ref 0–32)
AST SERPL-CCNC: 10 IU/L (ref 0–40)
BASOPHILS # BLD AUTO: 0 X10E3/UL (ref 0–0.2)
BASOPHILS NFR BLD AUTO: 0 %
BILIRUB SERPL-MCNC: <0.2 MG/DL (ref 0–1.2)
BUN SERPL-MCNC: 23 MG/DL (ref 6–24)
BUN/CREAT SERPL: 29 (ref 9–23)
CALCIUM SERPL-MCNC: 9.8 MG/DL (ref 8.7–10.2)
CHLORIDE SERPL-SCNC: 108 MMOL/L (ref 96–106)
CO2 SERPL-SCNC: 22 MMOL/L (ref 20–29)
CREAT SERPL-MCNC: 0.79 MG/DL (ref 0.57–1)
EGFRCR SERPLBLD CKD-EPI 2021: 89 ML/MIN/1.73
EOSINOPHIL # BLD AUTO: 0.1 X10E3/UL (ref 0–0.4)
EOSINOPHIL NFR BLD AUTO: 0 %
ERYTHROCYTE [DISTWIDTH] IN BLOOD BY AUTOMATED COUNT: 13 % (ref 11.7–15.4)
FERRITIN SERPL-MCNC: 48 NG/ML (ref 15–150)
FOLATE SERPL-MCNC: 19.8 NG/ML
GLOBULIN SER CALC-MCNC: 2.6 G/DL (ref 1.5–4.5)
GLUCOSE SERPL-MCNC: 147 MG/DL (ref 70–99)
HCT VFR BLD AUTO: 45.5 % (ref 34–46.6)
HGB BLD-MCNC: 15 G/DL (ref 11.1–15.9)
IMM GRANULOCYTES # BLD AUTO: 0.1 X10E3/UL (ref 0–0.1)
IMM GRANULOCYTES NFR BLD AUTO: 1 %
IRON SERPL-MCNC: 41 UG/DL (ref 27–159)
LYMPHOCYTES # BLD AUTO: 3.5 X10E3/UL (ref 0.7–3.1)
LYMPHOCYTES NFR BLD AUTO: 19 %
MCH RBC QN AUTO: 29 PG (ref 26.6–33)
MCHC RBC AUTO-ENTMCNC: 33 G/DL (ref 31.5–35.7)
MCV RBC AUTO: 88 FL (ref 79–97)
METHYLMALONATE SERPL-SCNC: 142 NMOL/L (ref 0–378)
MONOCYTES # BLD AUTO: 1.2 X10E3/UL (ref 0.1–0.9)
MONOCYTES NFR BLD AUTO: 7 %
NEUTROPHILS # BLD AUTO: 13.2 X10E3/UL (ref 1.4–7)
NEUTROPHILS NFR BLD AUTO: 73 %
PLATELET # BLD AUTO: 350 X10E3/UL (ref 150–450)
POTASSIUM SERPL-SCNC: 4.3 MMOL/L (ref 3.5–5.2)
PREALB SERPL-MCNC: 27 MG/DL (ref 10–36)
PROT SERPL-MCNC: 7 G/DL (ref 6–8.5)
RBC # BLD AUTO: 5.18 X10E6/UL (ref 3.77–5.28)
SODIUM SERPL-SCNC: 145 MMOL/L (ref 134–144)
VIT B1 BLD-SCNC: 243.6 NMOL/L (ref 66.5–200)
WBC # BLD AUTO: 18.1 X10E3/UL (ref 3.4–10.8)

## 2023-09-13 ENCOUNTER — OFFICE VISIT (OUTPATIENT)
Dept: BARIATRICS/WEIGHT MGMT | Facility: CLINIC | Age: 53
End: 2023-09-13
Payer: MEDICAID

## 2023-09-13 VITALS
SYSTOLIC BLOOD PRESSURE: 102 MMHG | DIASTOLIC BLOOD PRESSURE: 64 MMHG | BODY MASS INDEX: 34.74 KG/M2 | HEART RATE: 63 BPM | WEIGHT: 203.5 LBS | TEMPERATURE: 97.8 F | HEIGHT: 64 IN | OXYGEN SATURATION: 97 %

## 2023-09-13 DIAGNOSIS — R53.83 FATIGUE, UNSPECIFIED TYPE: ICD-10-CM

## 2023-09-13 DIAGNOSIS — E66.9 OBESITY, CLASS II, BMI 35-39.9: ICD-10-CM

## 2023-09-13 DIAGNOSIS — Z13.0 SCREENING, IRON DEFICIENCY ANEMIA: ICD-10-CM

## 2023-09-13 DIAGNOSIS — Z98.84 STATUS POST BARIATRIC SURGERY: Primary | ICD-10-CM

## 2023-09-13 DIAGNOSIS — Z90.3 POSTGASTRECTOMY MALABSORPTION: ICD-10-CM

## 2023-09-13 DIAGNOSIS — Z13.21 MALNUTRITION SCREEN: ICD-10-CM

## 2023-09-13 DIAGNOSIS — K91.2 POSTGASTRECTOMY MALABSORPTION: ICD-10-CM

## 2023-09-13 RX ORDER — ALOGLIPTIN 25 MG/1
1 TABLET, FILM COATED ORAL DAILY
COMMUNITY
Start: 2023-09-05

## 2023-09-13 NOTE — PROGRESS NOTES
[facility] Bariatric Surgery  [address]  [address]  [facility] [address] [phone]        Patient Name:  Emily Germain.  :  1970        Reason for Visit:   1 year postop      HPI: Emily Germain is a 53 y.o. female  s/p LSG/jayson 22 GDW     Doing well.  Has goal of 175lb, wanted to be there by the one year ella dick adan pletamard with progress.  No  GI issues/concerns. Denies dysphagia, reflux, nausea, vomiting, and abdominal pain.  Getting 80g prot/day. Mid afternoon shake.l Eating breakfast, lunch, dinner. Sausage alice, eggs. Doesn't really eat bread. Lunch- naked quesadilla, hamburger alice. Nuts.  Drinking 64+ fluid oz/day.  Last labs revealed bariatric levels wnl .  Taking Patches: MVI + b12 + iron + D .  On Nexium.  Exercising- walking .     Presurgery weight: 267 pounds.  Today's weight is 92.3 kg (203 lb 8 oz) pounds, today's  Body mass index is 35.48 kg/m²., andHIS@ weight loss since surgery is 64 pounds.      Past Medical History:   Diagnosis Date    Anxiety and depression     Cholelithiasis     dx 18+yrs ago, relatively asx    Diabetes     newly dx, no insulin, A1C >7    Dyspepsia     Dyspnea on exertion     Edema     HCTZ 12.5mg daily    Fatigue     Gallstone     GERD (gastroesophageal reflux disease)     on Nexium x 5+yrs, improves w/ wt.loss, remote EGD (20+yrs ago) unremarkable    HTN (hypertension)     Hyperlipidemia     Hypothyroidism     Knee pain     on Mobic, prn steroid injections (next due 3/2022)    Morbid obesity     PCOS (polycystic ovarian syndrome)     does not tolerate Metformin    Vitamin D deficiency     Wears contact lenses     Wears reading eyeglasses      Past Surgical History:   Procedure Laterality Date    CHOLECYSTECTOMY N/A 2022    Procedure: CHOLECYSTECTOMY LAPAROSCOPIC;  Surgeon: Jluis Del Valle MD;  Location: [facility];  Service: Bariatric;  Laterality: N/A;  [id number]    DILATATION AND CURETTAGE       emergent, miscarriage @ 6mos    ENDOMETRIAL ABLATION  2012    ENDOSCOPY      ENDOSCOPY N/A 09/23/2022    Procedure: ESOPHAGOGASTRODUODENOSCOPY;  Surgeon: Jluis Del Valle MD;  Location:  [facility];  Service: Bariatric;  Laterality: N/A;    GASTRECTOMY N/A 09/23/2022    Procedure: GASTRECTOMY LAPAROSCOPIC;  Surgeon: Jluis Del Valle MD;  Location:  [facility];  Service: Bariatric;  Laterality: N/A;    WISDOM TOOTH EXTRACTION       Outpatient Medications Marked as Taking for the 9/13/23 encounter (Office Visit) with Roberta Rivas PA-C   Medication Sig Dispense Refill    busPIRone (BUSPAR) 7.5 MG tablet Take 1 tablet by mouth 2 (Two) Times a Day.      cloNIDine (CATAPRES) 0.1 MG tablet Take 1 tablet by mouth As Needed for High Blood Pressure (greater than 165/95).      dilTIAZem CD (CARDIZEM CD) 180 MG 24 hr capsule Take 1 capsule by mouth Daily.      esomeprazole (nexIUM) 40 MG capsule Take 1 capsule by mouth Every Morning Before Breakfast.      fexofenadine (ALLEGRA) 180 MG tablet Take 1 tablet by mouth Daily.      hydroCHLOROthiazide (MICROZIDE) 12.5 MG capsule Take 1 capsule by mouth Daily.      levothyroxine (SYNTHROID, LEVOTHROID) 75 MCG tablet Take 1 tablet by mouth Daily.      losartan (COZAAR) 50 MG tablet Take 0.5 tablets by mouth Daily.      meloxicam (MOBIC) 15 MG tablet Take 1 tablet by mouth Daily.      Nesina 25 MG tablet Take 1 tablet by mouth Daily.      NON FORMULARY Multivitamin patches      nystatin (MYCOSTATIN) 148884 UNIT/GM powder Apply  topically to the appropriate area as directed 3 (Three) Times a Day. 60 g 1    Prenatal Vit-Fe Fumarate-FA (prenatal vitamin 27-0.8) 27-0.8 MG tablet tablet Take 1 tablet by mouth Daily.      rosuvastatin (CRESTOR) 10 MG tablet Take 1 tablet by mouth Daily.      Zinc 100 MG tablet Take 50 mg by mouth 2 (Two) Times a Day.         Allergies   Allergen Reactions    Tuberculin Tests Swelling     Allergy to tuberculin serum, entire arm swelled with PPD  "      Social History     Socioeconomic History    Marital status:    Tobacco Use    Smoking status: Never    Smokeless tobacco: Never   Vaping Use    Vaping Use: Never used   Substance and Sexual Activity    Alcohol use: Not Currently     Comment: rare    Drug use: Never    Sexual activity: Yes     Partners: Male     Birth control/protection: None     Comment: My        /64 (BP Location: Left arm, Patient Position: Sitting)   Pulse 63   Temp 97.8 °F (36.6 °C)   Ht 161.3 cm (63.5\")   Wt 92.3 kg (203 lb 8 oz)   SpO2 97%   BMI 35.48 kg/m²     Physical Exam  Constitutional:       Appearance: She is well-developed. She is obese.   HENT:      Head: Normocephalic and atraumatic.   Cardiovascular:      Rate and Rhythm: Normal rate and regular rhythm.   Pulmonary:      Effort: Pulmonary effort is normal.      Breath sounds: Normal breath sounds.   Abdominal:      General: Bowel sounds are normal.      Palpations: Abdomen is soft.   Skin:     General: Skin is warm and dry.   Neurological:      Mental Status: She is alert.   Psychiatric:         Mood and Affect: Mood normal.         Behavior: Behavior normal.         Thought Content: Thought content normal.         Judgment: Judgment normal.         Assessment:  s/p LSG/jayson 9/23/22 GDW       ICD-10-CM ICD-9-CM   1. Status post bariatric surgery  Z98.84 V45.86   2. Fatigue, unspecified type  R53.83 780.79   3. Screening, iron deficiency anemia  Z13.0 V78.0   4. Malnutrition screen  Z13.21 V77.2   5. Postgastrectomy malabsorption  K91.2 579.3    Z90.3    6. Obesity, Class II, BMI 35-39.9  E66.9 278.00         Plan:  discussed optimal diet plan. Continue w/ good food choices and healthy habits.  Continue to focus on high protein, low carb.  Keep tracking intake. Offered dietician follow up.   Encouraged  routine exercise with strength training.  Routine bariatric labs ordered.  Continue vitamins w/ adjustments pending lab results.  Call w/ " problems/concerns.         The patient was instructed to follow up in 6 months, sooner if needed.    I spent 30 minutes caring for Emily on this date of service. This time includes time spent by me in the following activities: preparing for the visit, reviewing tests, counseling and educating the patient/family/caregiver, ordering medications, tests, or procedures, and documenting information in the medical record.

## (undated) DEVICE — ENSEAL LAPAROSCOPIC TISSUE SEALER G2 ARTICULATING CURVED JAW FOR USE WITH G2 GENERATOR 5MM DIAMETER 45CM SHAFT LENGTH: Brand: ENSEAL

## (undated) DEVICE — UNDRPD COMFRT GLD DRYPAD 36X57IN

## (undated) DEVICE — GOWN,NON-REINFORCED,SIRUS,SET IN SLV,XXL: Brand: MEDLINE

## (undated) DEVICE — CONN TBG Y 5 IN 1 LF STRL

## (undated) DEVICE — PK BARIATRIC 10

## (undated) DEVICE — INTENDED USE FOR SURGICAL MARKING ON INTACT SKIN, ALSO PROVIDES A PERMANENT METHOD OF IDENTIFYING OBJECTS IN THE OPERATING ROOM: Brand: WRITESITE® REGULAR TIP SKIN MARKER

## (undated) DEVICE — ENDOPATH 5MM ENDOSCOPIC BLUNT TIP DISSECTORS (12 POUCHES CONTAINING 3 DISSECTORS EACH): Brand: ENDOPATH

## (undated) DEVICE — Device: Brand: DEFENDO AIR/WATER/SUCTION AND BIOPSY VALVE

## (undated) DEVICE — SUT MONOCRYL PLS ANTIB UND 3/0  PS1 27IN

## (undated) DEVICE — ENDOPATH XCEL BLADELESS TROCARS WITH STABILITY SLEEVES: Brand: ENDOPATH XCEL

## (undated) DEVICE — ENDOPATH XCEL UNIVERSAL TROCAR STABLILITY SLEEVES: Brand: ENDOPATH XCEL

## (undated) DEVICE — SHEET,DRAPE,40X58,STERILE: Brand: MEDLINE

## (undated) DEVICE — TROC STANDARDTROCAR FOR/TITANSGS 19MM DISP STRL

## (undated) DEVICE — GLV SURG SENSICARE PI MIC PF SZ8.5 LF STRL

## (undated) DEVICE — BLANKT WARM UPPR/BDY ARM/OUT 57X196CM

## (undated) DEVICE — SYR LUERLOK 30CC

## (undated) DEVICE — CONTN GRAD MEAS TRIANG 32OZ BLK

## (undated) DEVICE — [HIGH FLOW INSUFFLATOR,  DO NOT USE IF PACKAGE IS DAMAGED,  KEEP DRY,  KEEP AWAY FROM SUNLIGHT,  PROTECT FROM HEAT AND RADIOACTIVE SOURCES.]: Brand: PNEUMOSURE

## (undated) DEVICE — APL DUPLOSPRAYER MIS 40CM

## (undated) DEVICE — APPL COTN TP PLSTC 6IN STRL LF PK/2

## (undated) DEVICE — TROCAR: Brand: KII FIOS FIRST ENTRY

## (undated) DEVICE — SHT AIR TRANSFR COMFRT GLIDE LAT 40X80IN

## (undated) DEVICE — Device: Brand: STANDARD BOUGIE, 38FR

## (undated) DEVICE — LAPAROSCOPIC SMOKE FILTRATION SYSTEM: Brand: PALL LAPAROSHIELD® PLUS LAPAROSCOPIC SMOKE FILTRATION SYSTEM

## (undated) DEVICE — APPL CHLORAPREP TINTED 26ML TEAL

## (undated) DEVICE — GLV SURG SENSICARE SLT PF LF 9 STRL

## (undated) DEVICE — LAPAROVUE VISIBILITY SYSTEM LAPAROSCOPIC SOLUTIONS: Brand: LAPAROVUE

## (undated) DEVICE — PATIENT RETURN ELECTRODE, SINGLE-USE, CONTACT QUALITY MONITORING, ADULT, WITH 9FT CORD, FOR PATIENTS WEIGING OVER 33LBS. (15KG): Brand: MEGADYNE